# Patient Record
Sex: FEMALE | Race: WHITE | Employment: OTHER | URBAN - METROPOLITAN AREA
[De-identification: names, ages, dates, MRNs, and addresses within clinical notes are randomized per-mention and may not be internally consistent; named-entity substitution may affect disease eponyms.]

---

## 2020-12-09 ENCOUNTER — OFFICE VISIT (OUTPATIENT)
Dept: CARDIOLOGY CLINIC | Age: 85
End: 2020-12-09
Payer: MEDICARE

## 2020-12-09 VITALS
DIASTOLIC BLOOD PRESSURE: 80 MMHG | HEIGHT: 62 IN | OXYGEN SATURATION: 97 % | HEART RATE: 106 BPM | RESPIRATION RATE: 18 BRPM | SYSTOLIC BLOOD PRESSURE: 120 MMHG | BODY MASS INDEX: 34.6 KG/M2 | WEIGHT: 188 LBS

## 2020-12-09 DIAGNOSIS — E78.2 MIXED HYPERLIPIDEMIA: ICD-10-CM

## 2020-12-09 DIAGNOSIS — R06.02 SOB (SHORTNESS OF BREATH): ICD-10-CM

## 2020-12-09 DIAGNOSIS — Z82.49 FAMILY HISTORY OF EARLY CAD: ICD-10-CM

## 2020-12-09 DIAGNOSIS — I20.0 UNSTABLE ANGINA (HCC): Primary | ICD-10-CM

## 2020-12-09 DIAGNOSIS — R00.0 TACHYCARDIA: ICD-10-CM

## 2020-12-09 DIAGNOSIS — Z87.891 HISTORY OF TOBACCO USE: ICD-10-CM

## 2020-12-09 DIAGNOSIS — I10 BENIGN ESSENTIAL HTN: ICD-10-CM

## 2020-12-09 PROCEDURE — 1090F PRES/ABSN URINE INCON ASSESS: CPT | Performed by: INTERNAL MEDICINE

## 2020-12-09 PROCEDURE — G8417 CALC BMI ABV UP PARAM F/U: HCPCS | Performed by: INTERNAL MEDICINE

## 2020-12-09 PROCEDURE — 93010 ELECTROCARDIOGRAM REPORT: CPT | Performed by: INTERNAL MEDICINE

## 2020-12-09 PROCEDURE — 99204 OFFICE O/P NEW MOD 45 MIN: CPT | Performed by: INTERNAL MEDICINE

## 2020-12-09 PROCEDURE — 1101F PT FALLS ASSESS-DOCD LE1/YR: CPT | Performed by: INTERNAL MEDICINE

## 2020-12-09 PROCEDURE — G8510 SCR DEP NEG, NO PLAN REQD: HCPCS | Performed by: INTERNAL MEDICINE

## 2020-12-09 PROCEDURE — 93005 ELECTROCARDIOGRAM TRACING: CPT | Performed by: INTERNAL MEDICINE

## 2020-12-09 PROCEDURE — G8536 NO DOC ELDER MAL SCRN: HCPCS | Performed by: INTERNAL MEDICINE

## 2020-12-09 PROCEDURE — G0463 HOSPITAL OUTPT CLINIC VISIT: HCPCS | Performed by: INTERNAL MEDICINE

## 2020-12-09 PROCEDURE — G8400 PT W/DXA NO RESULTS DOC: HCPCS | Performed by: INTERNAL MEDICINE

## 2020-12-09 PROCEDURE — G8427 DOCREV CUR MEDS BY ELIG CLIN: HCPCS | Performed by: INTERNAL MEDICINE

## 2020-12-09 RX ORDER — CHOLECALCIFEROL (VITAMIN D3) 125 MCG
100 CAPSULE ORAL DAILY
COMMUNITY

## 2020-12-09 RX ORDER — MINERAL OIL
180 ENEMA (ML) RECTAL AS NEEDED
COMMUNITY

## 2020-12-09 RX ORDER — MULTIVITAMIN
1 TABLET ORAL DAILY
COMMUNITY

## 2020-12-09 RX ORDER — SIMVASTATIN 20 MG/1
TABLET, FILM COATED ORAL
COMMUNITY

## 2020-12-09 RX ORDER — BISMUTH SUBSALICYLATE 262 MG
1 TABLET,CHEWABLE ORAL DAILY
COMMUNITY

## 2020-12-09 RX ORDER — LISINOPRIL 20 MG/1
20 TABLET ORAL DAILY
COMMUNITY
Start: 2020-12-07

## 2020-12-09 RX ORDER — GLUCOSAMINE SULFATE 1500 MG
POWDER IN PACKET (EA) ORAL DAILY
COMMUNITY

## 2020-12-09 RX ORDER — AMOXICILLIN 500 MG/1
CAPSULE ORAL
COMMUNITY
Start: 2020-11-15

## 2020-12-09 RX ORDER — LANOLIN ALCOHOL/MO/W.PET/CERES
1000 CREAM (GRAM) TOPICAL DAILY
COMMUNITY

## 2020-12-09 RX ORDER — DICLOFENAC SODIUM 75 MG/1
TABLET, DELAYED RELEASE ORAL
COMMUNITY
Start: 2020-12-04

## 2020-12-09 NOTE — PROGRESS NOTES
[address]: Olivia Barajas  [phone]    History of Present Illness: Ms. Kyung Interiano is an 83 yo F with a history of tobacco use, essential hypertension, mixed hyperlipidemia, stage 4 bladder cancer, status post resection and therapy in remission since approximately ten years ago, referred by Dr. Natty Pardo for cardiac evaluation. She is here due to progressive shortness of breath. She thinks it has been the case for the last year or two. She does have some baseline anemia that has been monitored. She denies any exertional chest pain. She gets occasional cramping pain over her body and chest, but this is nonexertional.  She gets occasional palpitations with activity. No lightheadedness or dizziness. She is compensated on exam with clear lungs. She does have chronic trace to 1+ lower extremity edema. Her EKG is sinus tachycardia, heart rate of 106. There is a single PVC. Soc hx. Tobacco use quit several yrs ago. Fam hx. Father with CAD 62s, . Assessment and Plan:    1. Unstable angina. Multiple risk factors, dyspnea on exertion, could be anginal equivalent; will proceed with an echocardiogram and stress test for further evaluation. She has a history of hip issues and surgery and will do this Lexiscan. 2. Essential hypertension. Blood pressure is controlled. 3. Mixed hyperlipidemia. 4. Stage 4 bladder cancer, in remission. 5. History of tobacco use. Quit many years ago. 6. Family history of early coronary artery disease. She  has no past medical history on file. All other systems negative except as above. PE  Vitals:    12/09/20 1106   BP: 120/80   Pulse: (!) 106   Resp: 18   SpO2: 97%   Weight: 188 lb (85.3 kg)   Height: 5' 2\" (1.575 m)    Body mass index is 34.39 kg/m².    General appearance - alert, well appearing, and in no distress  Mental status - affect appropriate to mood  Eyes - sclera anicteric, moist mucous membranes  Neck - supple, no JVD  Chest - clear to auscultation, no wheezes, rales or rhonchi  Heart - normal rate, regular rhythm, normal S1, S2, no murmurs, rubs, clicks or gallops  Abdomen - soft, nontender, nondistended, no masses or organomegaly  Neurological - no focal deficit  Extremities - peripheral pulses normal, no pedal edema    Recent Labs:  No results found for: CHOL, CHOLX, CHLST, CHOLV, 655847, HDL, HDLP, LDL, LDLC, DLDLP, TGLX, TRIGL, TRIGP, CHHD, CHHDX  No results found for: MACO, CREAPOC, ACREA, CREA, REFC3, REFC4  No results found for: BUN, BUNPOC, IBUN, MBUNV, BUNV  No results found for: K, KI, PLK, WBK  No results found for: HBA1C, HGBE8, SAB3JFYY, OZG1QNCI  No results found for: HGBPOC, HGB, HGBP, HGBEXT, HGBEXT  No results found for: PLT, PLTEXT, PLTEXT    Reviewed:  No past medical history on file. Social History     Tobacco Use   Smoking Status Former Smoker   Smokeless Tobacco Never Used     Social History     Substance and Sexual Activity   Alcohol Use Yes     Allergies   Allergen Reactions    Sulfa (Sulfonamide Antibiotics) Itching       Current Outpatient Medications   Medication Sig    amoxicillin (AMOXIL) 500 mg capsule TAKE 4 CAPSULES BY MOUTH 1 HR PRIOR TO DENTAL PROCEDURE    cyanocobalamin 1,000 mcg tablet Take 1,000 mcg by mouth daily.  diclofenac EC (VOLTAREN) 75 mg EC tablet TAKE 1 TABLET BY MOUTH EVERY MORNING WITH FOOD    fexofenadine (ALLEGRA) 180 mg tablet Take 180 mg by mouth as needed.  lisinopriL (PRINIVIL, ZESTRIL) 20 mg tablet Take 20 mg by mouth daily.  calcium-cholecalciferol, D3, (CALTRATE 600+D) tablet Take 1 Tab by mouth daily.  co-enzyme Q-10 (Co Q-10) 100 mg capsule Take 100 mg by mouth daily.  multivitamin (ONE A DAY) tablet Take 1 Tab by mouth daily.  simvastatin (ZOCOR) 20 mg tablet Take  by mouth nightly.  cholecalciferol (Vitamin D3) 25 mcg (1,000 unit) cap Take  by mouth daily. No current facility-administered medications for this visit.         MD Yossi Ar[facility] [place]  [address]  [address]

## 2020-12-29 ENCOUNTER — TELEPHONE (OUTPATIENT)
Dept: CARDIOLOGY CLINIC | Age: 85
End: 2020-12-29

## 2020-12-30 ENCOUNTER — ANCILLARY PROCEDURE (OUTPATIENT)
Dept: CARDIOLOGY CLINIC | Age: 85
End: 2020-12-30
Payer: MEDICARE

## 2020-12-30 ENCOUNTER — DOCUMENTATION ONLY (OUTPATIENT)
Dept: CARDIOLOGY CLINIC | Age: 85
End: 2020-12-30

## 2020-12-30 VITALS
BODY MASS INDEX: 34.6 KG/M2 | SYSTOLIC BLOOD PRESSURE: 134 MMHG | HEIGHT: 62 IN | WEIGHT: 188 LBS | DIASTOLIC BLOOD PRESSURE: 84 MMHG

## 2020-12-30 VITALS
WEIGHT: 188 LBS | HEIGHT: 62 IN | DIASTOLIC BLOOD PRESSURE: 80 MMHG | SYSTOLIC BLOOD PRESSURE: 120 MMHG | BODY MASS INDEX: 34.6 KG/M2

## 2020-12-30 DIAGNOSIS — R00.0 TACHYCARDIA: ICD-10-CM

## 2020-12-30 DIAGNOSIS — I20.0 UNSTABLE ANGINA (HCC): ICD-10-CM

## 2020-12-30 DIAGNOSIS — E78.2 MIXED HYPERLIPIDEMIA: ICD-10-CM

## 2020-12-30 DIAGNOSIS — R06.02 SOB (SHORTNESS OF BREATH): ICD-10-CM

## 2020-12-30 DIAGNOSIS — Z87.891 HISTORY OF TOBACCO USE: ICD-10-CM

## 2020-12-30 DIAGNOSIS — I10 BENIGN ESSENTIAL HTN: ICD-10-CM

## 2020-12-30 DIAGNOSIS — Z82.49 FAMILY HISTORY OF EARLY CAD: ICD-10-CM

## 2020-12-30 LAB
ECHO AO ASC DIAM: 3.62 CM
ECHO AO ROOT DIAM: 3.53 CM
ECHO AV AREA PEAK VELOCITY: 2.33 CM2
ECHO AV AREA VTI: 2.22 CM2
ECHO AV AREA/BSA PEAK VELOCITY: 1.3 CM2/M2
ECHO AV AREA/BSA VTI: 1.2 CM2/M2
ECHO AV MEAN GRADIENT: 6.99 MMHG
ECHO AV PEAK GRADIENT: 12.1 MMHG
ECHO AV PEAK VELOCITY: 173.53 CM/S
ECHO AV VTI: 31.93 CM
ECHO EST RA PRESSURE: 8 MMHG
ECHO IVC PROX: 2.27 CM
ECHO LA AREA 4C: 23.29 CM2
ECHO LA MAJOR AXIS: 3.86 CM
ECHO LA MINOR AXIS: 2.07 CM
ECHO LA VOL 2C: 74.77 ML (ref 22–52)
ECHO LA VOL 4C: 70.54 ML (ref 22–52)
ECHO LA VOL BP: 77.29 ML (ref 22–52)
ECHO LA VOL/BSA BIPLANE: 41.51 ML/M2 (ref 16–28)
ECHO LA VOLUME INDEX A2C: 40.16 ML/M2 (ref 16–28)
ECHO LA VOLUME INDEX A4C: 37.89 ML/M2 (ref 16–28)
ECHO LV E' LATERAL VELOCITY: 4.52 CM/S
ECHO LV E' SEPTAL VELOCITY: 5.41 CM/S
ECHO LV EDV A2C: 175.9 ML
ECHO LV EDV A4C: 130.71 ML
ECHO LV EDV BP: 157.34 ML (ref 56–104)
ECHO LV EDV INDEX A4C: 70.2 ML/M2
ECHO LV EDV INDEX BP: 84.5 ML/M2
ECHO LV EDV NDEX A2C: 94.5 ML/M2
ECHO LV EJECTION FRACTION 3D: 22.9 PERCENT
ECHO LV EJECTION FRACTION A2C: 12 PERCENT
ECHO LV EJECTION FRACTION A4C: 27 PERCENT
ECHO LV EJECTION FRACTION BIPLANE: 19 PERCENT (ref 55–100)
ECHO LV ESV A2C: 155.45 ML
ECHO LV ESV A4C: 95.08 ML
ECHO LV ESV BP: 127.42 ML (ref 19–49)
ECHO LV ESV INDEX A2C: 83.5 ML/M2
ECHO LV ESV INDEX A4C: 51.1 ML/M2
ECHO LV ESV INDEX BP: 68.4 ML/M2
ECHO LV GLOBAL LONGITUDINAL STRAIN (GLS): -5.7 PERCENT
ECHO LV INTERNAL DIMENSION DIASTOLIC: 5.53 CM (ref 3.9–5.3)
ECHO LV INTERNAL DIMENSION SYSTOLIC: 5.2 CM
ECHO LV IVSD: 1.44 CM (ref 0.6–0.9)
ECHO LV MASS 2D: 360.1 G (ref 67–162)
ECHO LV MASS INDEX 2D: 193.4 G/M2 (ref 43–95)
ECHO LV POSTERIOR WALL DIASTOLIC: 1.47 CM (ref 0.6–0.9)
ECHO LVOT DIAM: 2.54 CM
ECHO LVOT PEAK GRADIENT: 2.56 MMHG
ECHO LVOT PEAK VELOCITY: 79.67 CM/S
ECHO LVOT SV: 71 ML
ECHO LVOT VTI: 13.96 CM
ECHO MV A VELOCITY: 123.95 CM/S
ECHO MV AREA PHT: 7.44 CM2
ECHO MV E DECELERATION TIME (DT): 101.99 MS
ECHO MV E VELOCITY: 103.59 CM/S
ECHO MV E/A RATIO: 0.84
ECHO MV E/E' LATERAL: 22.92
ECHO MV E/E' RATIO (AVERAGED): 21.03
ECHO MV E/E' SEPTAL: 19.15
ECHO MV PRESSURE HALF TIME (PHT): 29.58 MS
ECHO RA MAJOR AXIS: 3.93 CM
ECHO RIGHT VENTRICULAR SYSTOLIC PRESSURE (RVSP): 41.32 MMHG
ECHO RV INTERNAL DIMENSION: 3.38 CM
ECHO RV TAPSE: 1.54 CM (ref 1.5–2)
ECHO TV REGURGITANT MAX VELOCITY: 288.63 CM/S
ECHO TV REGURGITANT PEAK GRADIENT: 33.32 MMHG
GLOBAL LONGITUDINAL STRAIN 2 CHAMBER: -6.3 PERCENT
GLOBAL LONGITUDINAL STRAIN 4 CHAMBER: -6.7 PERCENT
GLOBAL LONGITUDINAL STRAIN LONG AXIS: -4.3 PERCENT
LA VOL DISK BP: 74.05 ML (ref 22–52)
LVOT MG: 1.44 MMHG

## 2020-12-30 PROCEDURE — 93306 TTE W/DOPPLER COMPLETE: CPT | Performed by: SPECIALIST

## 2020-12-30 PROCEDURE — 93018 CV STRESS TEST I&R ONLY: CPT | Performed by: INTERNAL MEDICINE

## 2020-12-30 PROCEDURE — 93016 CV STRESS TEST SUPVJ ONLY: CPT | Performed by: INTERNAL MEDICINE

## 2020-12-30 PROCEDURE — 78452 HT MUSCLE IMAGE SPECT MULT: CPT | Performed by: INTERNAL MEDICINE

## 2020-12-30 PROCEDURE — A9500 TC99M SESTAMIBI: HCPCS | Performed by: INTERNAL MEDICINE

## 2020-12-30 RX ORDER — FUROSEMIDE 40 MG/1
40 TABLET ORAL DAILY
Qty: 40 TAB | Refills: 3 | Status: SHIPPED | OUTPATIENT
Start: 2020-12-30 | End: 2021-01-13

## 2020-12-30 RX ORDER — TETRAKIS(2-METHOXYISOBUTYLISOCYANIDE)COPPER(I) TETRAFLUOROBORATE 1 MG/ML
10 INJECTION, POWDER, LYOPHILIZED, FOR SOLUTION INTRAVENOUS ONCE
Status: COMPLETED | OUTPATIENT
Start: 2020-12-30 | End: 2020-12-30

## 2020-12-30 RX ORDER — TETRAKIS(2-METHOXYISOBUTYLISOCYANIDE)COPPER(I) TETRAFLUOROBORATE 1 MG/ML
30 INJECTION, POWDER, LYOPHILIZED, FOR SOLUTION INTRAVENOUS ONCE
Status: COMPLETED | OUTPATIENT
Start: 2020-12-30 | End: 2020-12-30

## 2020-12-30 RX ADMIN — TECHNETIUM TC 99M SESTAMIBI 25.4 MILLICURIE: 1 INJECTION INTRAVENOUS at 11:00

## 2020-12-30 RX ADMIN — TETRAKIS(2-METHOXYISOBUTYLISOCYANIDE)COPPER(I) TETRAFLUOROBORATE 8.7 MILLICURIE: 1 INJECTION, POWDER, LYOPHILIZED, FOR SOLUTION INTRAVENOUS at 09:15

## 2020-12-30 RX ADMIN — REGADENOSON 0.4 MG: 0.08 INJECTION, SOLUTION INTRAVENOUS at 11:00

## 2020-12-30 NOTE — PROGRESS NOTES
Echo on roca pt. lvef is way down. I told her about it, she looked fine so I said to proceed with kamini. Jennie Esters, this is pt we started on lasix and should have appt with dr Kathy Walsh next week to make further med adjustments and review nuc results.
Future Appointments   Date Time Provider Dinah Conti   1/8/2021 11:20 AM MD CHRISTIANO Cloud AMB
Self

## 2020-12-30 NOTE — PROGRESS NOTES
Dr. Ashley Rodriguez saw patient today during Stress test. Dr. Ashley Rodriguez was informed EF was 15% and started lasix 40 mg once a day. Per Dr. Ashley Rodriguez scheduled pt to be seen by Dr. Dana Whitt next week to go over results and start new medications. Pt verbalized understanding of information discussed w/ no further questions at this time. Cardiologist: Dr. Ashley Rodriguez    Last appt: 12/9/2020  Future Appointments   Date Time Provider Dinah Conti   1/8/2021 11:20 AM MD CHRISTIANO Lopez AMB       Requested Prescriptions     Signed Prescriptions Disp Refills    furosemide (LASIX) 40 mg tablet 40 Tab 3     Sig: Take 1 Tab by mouth daily. Refills VO per Dr. Ashley Rodriguez.

## 2021-01-04 LAB
STRESS BASELINE DIAS BP: 84 MMHG
STRESS BASELINE HR: 92 BPM
STRESS BASELINE SYS BP: 134 MMHG
STRESS O2 SAT PEAK: 96 %
STRESS O2 SAT REST: 99 %
STRESS PEAK DIAS BP: 68 MMHG
STRESS PEAK SYS BP: 115 MMHG
STRESS PERCENT HR ACHIEVED: 77 %
STRESS POST PEAK HR: 105 BPM
STRESS RATE PRESSURE PRODUCT: NORMAL BPM*MMHG
STRESS TARGET HR: 137 BPM

## 2021-01-08 ENCOUNTER — OFFICE VISIT (OUTPATIENT)
Dept: CARDIOLOGY CLINIC | Age: 86
End: 2021-01-08
Payer: MEDICARE

## 2021-01-08 VITALS
OXYGEN SATURATION: 97 % | BODY MASS INDEX: 33.31 KG/M2 | RESPIRATION RATE: 14 BRPM | HEART RATE: 100 BPM | DIASTOLIC BLOOD PRESSURE: 70 MMHG | HEIGHT: 62 IN | SYSTOLIC BLOOD PRESSURE: 120 MMHG | WEIGHT: 181 LBS

## 2021-01-08 DIAGNOSIS — I10 BENIGN ESSENTIAL HTN: ICD-10-CM

## 2021-01-08 DIAGNOSIS — I20.0 UNSTABLE ANGINA (HCC): Primary | ICD-10-CM

## 2021-01-08 DIAGNOSIS — I42.0 DILATED CARDIOMYOPATHY (HCC): ICD-10-CM

## 2021-01-08 DIAGNOSIS — Z87.891 HISTORY OF TOBACCO USE: ICD-10-CM

## 2021-01-08 DIAGNOSIS — E78.2 MIXED HYPERLIPIDEMIA: ICD-10-CM

## 2021-01-08 DIAGNOSIS — Z82.49 FAMILY HISTORY OF EARLY CAD: ICD-10-CM

## 2021-01-08 PROCEDURE — G8510 SCR DEP NEG, NO PLAN REQD: HCPCS | Performed by: INTERNAL MEDICINE

## 2021-01-08 PROCEDURE — G8427 DOCREV CUR MEDS BY ELIG CLIN: HCPCS | Performed by: INTERNAL MEDICINE

## 2021-01-08 PROCEDURE — G0463 HOSPITAL OUTPT CLINIC VISIT: HCPCS | Performed by: INTERNAL MEDICINE

## 2021-01-08 PROCEDURE — G8752 SYS BP LESS 140: HCPCS | Performed by: INTERNAL MEDICINE

## 2021-01-08 PROCEDURE — G8536 NO DOC ELDER MAL SCRN: HCPCS | Performed by: INTERNAL MEDICINE

## 2021-01-08 PROCEDURE — 1090F PRES/ABSN URINE INCON ASSESS: CPT | Performed by: INTERNAL MEDICINE

## 2021-01-08 PROCEDURE — G8417 CALC BMI ABV UP PARAM F/U: HCPCS | Performed by: INTERNAL MEDICINE

## 2021-01-08 PROCEDURE — G8754 DIAS BP LESS 90: HCPCS | Performed by: INTERNAL MEDICINE

## 2021-01-08 PROCEDURE — G8400 PT W/DXA NO RESULTS DOC: HCPCS | Performed by: INTERNAL MEDICINE

## 2021-01-08 PROCEDURE — 1101F PT FALLS ASSESS-DOCD LE1/YR: CPT | Performed by: INTERNAL MEDICINE

## 2021-01-08 PROCEDURE — 99215 OFFICE O/P EST HI 40 MIN: CPT | Performed by: INTERNAL MEDICINE

## 2021-01-08 RX ORDER — GLUCOSAMINE/CHONDR SU A SOD 750-600 MG
1 TABLET ORAL DAILY
COMMUNITY

## 2021-01-08 RX ORDER — CARVEDILOL 6.25 MG/1
6.25 TABLET ORAL 2 TIMES DAILY WITH MEALS
Qty: 60 TAB | Refills: 5 | Status: ON HOLD | OUTPATIENT
Start: 2021-01-08 | End: 2021-01-15 | Stop reason: SDUPTHER

## 2021-01-08 RX ORDER — GUAIFENESIN 100 MG/5ML
81 LIQUID (ML) ORAL DAILY
Qty: 30 TAB | Refills: 5 | Status: SHIPPED | OUTPATIENT
Start: 2021-01-08 | End: 2021-07-01

## 2021-01-08 NOTE — PROGRESS NOTES
[address]: Wilberto Wilhelm  [phone]    History of Present Illness: Ms. Fede Hebert is an 83 yo F with a history of tobacco use, essential hypertension, mixed hyperlipidemia, stage 4 bladder cancer, status post resection and therapy in remission since approximately ten years ago, seen initially for shortness of breath. On her last visit due to unstable angina, progressive shortness of breath, proceeded with an echocardiogram and stress test for further evaluation. Her echocardiogram ended up being abnormal demonstrating an EF of 20% and her subsequent stress test demonstrated small to medium sized reversible lesion, apical and lateral reversibility consistent with ischemia. Her EF was calculated at 34% by nuclear scan. After her echocardiogram, Dr. Carolin Mcgee started Lasix and overall symptom wise she says her breathing is better. She denies any exertional chest pain. No significant lightheadedness, dizziness or syncope. She does live with her daughter. She was [facility] for a tooth in her jaw that may need to come out. She is compensated on exam with clear lungs. She does have 1+ bilateral lower extremity edema. Assessment and Plan:    1. Severe cardiomyopathy. Overall compensated. High suspicion this is due to underlying coronary artery disease. Her stress test was also abnormal apical lateral.  Discussed the benefits and risks of cardiac catheterization and she is agreeable to proceed with this and will set this up. In the meantime, she is already on an ACE inhibitor and diuretic and will add Coreg and aspirin. Continue her statin. 2. Essential hypertension. Blood pressure is controlled. 3. Mixed hyperlipidemia. Tolerating statin. 4. Stage 4 bladder cancer in remission. 5. History of tobacco use. Quit many years ago. 6. Family history of early coronary artery disease. She  has no past medical history on file. All other systems negative except as above. PE  Vitals:    01/08/21 1117   BP: 120/70   Pulse: 100   Resp: 14   SpO2: 97%   Weight: 181 lb (82.1 kg)   Height: 5' 2\" (1.575 m)    Body mass index is 33.11 kg/m². General appearance - alert, well appearing, and in no distress  Mental status - affect appropriate to mood  Eyes - sclera anicteric, moist mucous membranes  Neck - supple, no JVD  Chest - clear to auscultation, no wheezes, rales or rhonchi  Heart - normal rate, regular rhythm, normal S1, S2, no murmurs, rubs, clicks or gallops  Abdomen - soft, nontender, nondistended, no masses or organomegaly  Neurological - no focal deficit  Extremities - peripheral pulses normal, no pedal edema    Recent Labs:  No results found for: CHOL, CHOLX, CHLST, CHOLV, 818882, HDL, HDLP, LDL, LDLC, DLDLP, TGLX, TRIGL, TRIGP, CHHD, CHHDX  No results found for: MACO, CREAPOC, ACREA, CREA, REFC3, REFC4  No results found for: BUN, BUNPOC, IBUN, MBUNV, BUNV  No results found for: K, KI, PLK, WBK  No results found for: HBA1C, HGBE8, LHZ2NNRC, HPN6ADYV  No results found for: HGBPOC, HGB, HGBP, HGBEXT, HGBEXT  No results found for: PLT, PLTEXT, PLTEXT    Reviewed:  No past medical history on file. Social History     Tobacco Use   Smoking Status Former Smoker   Smokeless Tobacco Never Used     Social History     Substance and Sexual Activity   Alcohol Use Yes     Allergies   Allergen Reactions    Sulfa (Sulfonamide Antibiotics) Itching       Current Outpatient Medications   Medication Sig    Biotin 2,500 mcg cap Take 1 Cap by mouth daily.  furosemide (LASIX) 40 mg tablet Take 1 Tab by mouth daily.  amoxicillin (AMOXIL) 500 mg capsule TAKE 4 CAPSULES BY MOUTH 1 HR PRIOR TO DENTAL PROCEDURE    cyanocobalamin 1,000 mcg tablet Take 1,000 mcg by mouth daily.  diclofenac EC (VOLTAREN) 75 mg EC tablet TAKE 1 TABLET BY MOUTH EVERY MORNING WITH FOOD    fexofenadine (ALLEGRA) 180 mg tablet Take 180 mg by mouth as needed.     lisinopriL (PRINIVIL, ZESTRIL) 20 mg tablet Take 20 mg by mouth daily.  calcium-cholecalciferol, D3, (CALTRATE 600+D) tablet Take 1 Tab by mouth daily.  co-enzyme Q-10 (Co Q-10) 100 mg capsule Take 100 mg by mouth daily.  multivitamin (ONE A DAY) tablet Take 1 Tab by mouth daily.  simvastatin (ZOCOR) 20 mg tablet Take  by mouth nightly.  cholecalciferol (Vitamin D3) 25 mcg (1,000 unit) cap Take  by mouth daily. No current facility-administered medications for this visit.         Ethel Haney MD  [facility] heart and Vascular [place]  [address] [facility] [address]  [facility], [address]

## 2021-01-08 NOTE — LETTER
Patient:  Connie Tejeda YOB: 1935 Date of Visit: 1/8/2021 Yohan Ibarra MD 
[address] [place] [address] 7 [phone] Via Fax: [phone]: 
 
Dear Zak Guzman, 
 
Ms. Catie Amos is an 83 yo F with a history of tobacco use, essential hypertension, mixed hyperlipidemia, stage 4 bladder cancer, status post resection and therapy in remission since approximately ten years ago, seen initially for shortness of breath. On her last visit due to unstable angina, progressive shortness of breath, proceeded with an echocardiogram and stress test for further evaluation. Her echocardiogram ended up being abnormal demonstrating an EF of 20% and her subsequent stress test demonstrated small to medium sized reversible lesion, apical and lateral reversibility consistent with ischemia. Her EF was calculated at 34% by nuclear scan. After her echocardiogram, Dr. Rebecca Hawkins started Lasix and overall symptom wise she says her breathing is better. She denies any exertional chest pain. No significant lightheadedness, dizziness or syncope. She does live with her daughter. She was seeing [facility] for a tooth in her jaw that may need to come out. She is compensated on exam with clear lungs. She does have 1+ bilateral lower extremity edema. Assessment and Plan: 1. Severe cardiomyopathy. Overall compensated. High suspicion this is due to underlying coronary artery disease. Her stress test was also abnormal apical lateral.  Discussed the benefits and risks of cardiac catheterization and she is agreeable to proceed with this and will set this up. In the meantime, she is already on an ACE inhibitor and diuretic and will add Coreg and aspirin. Continue her statin. 2. Essential hypertension. Blood pressure is controlled. 3. Mixed hyperlipidemia. Tolerating statin. 4. Stage 4 bladder cancer in remission. 5. History of tobacco use. Quit many years ago. 6. Family history of early coronary artery disease. If you have questions, please do not hesitate to call me. Sincerely, Jillian Kim MD

## 2021-01-08 NOTE — PATIENT INSTRUCTIONS
Good Religious address: 
[facility], [place] Procedure:Left Heart Cath Your procedure is scheduled for 1/15/21 @ 1:00 pm. You need to arrive about 2 hours prior to procedure, so please arrive by 11:00 am to [address] will proceed to the main entrance of the hospital where you will be screened and checked in. Bring your insurance information and list of current medications. You may not have anything to eat or drink after midngiht, except for sips of water to take medications. Medication instructions: Take with sip of water *Have lab work completed no more then 30 days prior to the procedure but at least 2-3 days prior to the procedure. *You will need someone to drive you home after the procedure. Plan to be at [facility] a total of 6-8 hours. *Arrange for a responsible adult to help you at home for at least 24 hours, 
*Wear comfortable clothing. Leave jewelry, money, and other valuables at home. You may wear dentures, eyeglasses, and/or hearing aids. *Bring an overnight bag with you (just incase you need to spend the night.) Post Procedure Instructions: 
*No driving for 24 hours post procedure. *No heavy lifting (over 10 lbs) or strenuous activity for 48 hours. *No tub baths, swimming, hot tubs, or spas for 1 week. The band aid over cath site may be removed the day after procedure and site washed gently with soap and water. *The site may appear bruised/ discolored for a couple of weeks. A small knot may be present. You may experience tenderness or soreness in groin area. This may be relieved with the use of Tylenol. *If there is any visible blood at the site, hold pressure for 20 minutes. *Call the office if you should notice numbness, tingling, coldness, or loss of feeling in the area. Call the office if you have a fever within 2-3 days after procedure. Remember, when you begin lifting things, use proper body mechanics and bend with your knees, centering the weight on your legs. Please call with any questions: [phone]. You may also contact the cath lab directly at [phone]

## 2021-01-13 ENCOUNTER — TELEPHONE (OUTPATIENT)
Dept: CARDIOLOGY CLINIC | Age: 86
End: 2021-01-13

## 2021-01-13 RX ORDER — FUROSEMIDE 20 MG/1
20 TABLET ORAL DAILY
Qty: 30 TAB | Refills: 0
Start: 2021-01-13 | End: 2021-04-02

## 2021-01-13 NOTE — TELEPHONE ENCOUNTER
----- Message from Ethel Haney MD sent at 1/8/2021 10:39 PM EST -----  Please let pt know labs showed elevated Cr and would hold lasix for 3 days then restart at half dose.  thx

## 2021-01-13 NOTE — TELEPHONE ENCOUNTER
Called patient. Two patient indentifiers verified. Pt was informed of message. Pt verbalized understanding and denies any further questions at this time.  Updated medications list

## 2021-01-14 RX ORDER — SODIUM CHLORIDE 0.9 % (FLUSH) 0.9 %
5-40 SYRINGE (ML) INJECTION EVERY 8 HOURS
Status: CANCELLED | OUTPATIENT
Start: 2021-01-14

## 2021-01-14 RX ORDER — SODIUM CHLORIDE 9 MG/ML
75 INJECTION, SOLUTION INTRAVENOUS CONTINUOUS
Status: CANCELLED | OUTPATIENT
Start: 2021-01-14 | End: 2021-01-14

## 2021-01-14 RX ORDER — DIPHENHYDRAMINE HYDROCHLORIDE 50 MG/ML
50 INJECTION, SOLUTION INTRAMUSCULAR; INTRAVENOUS
Status: CANCELLED | OUTPATIENT
Start: 2021-01-14 | End: 2021-01-15

## 2021-01-15 ENCOUNTER — HOSPITAL ENCOUNTER (OUTPATIENT)
Age: 86
Setting detail: OUTPATIENT SURGERY
Discharge: HOME | End: 2021-01-15
Attending: INTERNAL MEDICINE | Admitting: INTERNAL MEDICINE
Payer: MEDICARE

## 2021-01-15 VITALS
RESPIRATION RATE: 17 BRPM | DIASTOLIC BLOOD PRESSURE: 80 MMHG | HEART RATE: 74 BPM | WEIGHT: 184 LBS | HEIGHT: 62 IN | BODY MASS INDEX: 33.86 KG/M2 | SYSTOLIC BLOOD PRESSURE: 148 MMHG | TEMPERATURE: 98.5 F | OXYGEN SATURATION: 96 %

## 2021-01-15 DIAGNOSIS — I20.0 UNSTABLE ANGINA (HCC): ICD-10-CM

## 2021-01-15 PROCEDURE — 93458 L HRT ARTERY/VENTRICLE ANGIO: CPT | Performed by: INTERNAL MEDICINE

## 2021-01-15 PROCEDURE — C1894 INTRO/SHEATH, NON-LASER: HCPCS | Performed by: INTERNAL MEDICINE

## 2021-01-15 PROCEDURE — 74011000636 HC RX REV CODE- 636: Performed by: INTERNAL MEDICINE

## 2021-01-15 PROCEDURE — 99153 MOD SED SAME PHYS/QHP EA: CPT | Performed by: INTERNAL MEDICINE

## 2021-01-15 PROCEDURE — 77030004532 HC CATH ANGI DX IMP BSC -A: Performed by: INTERNAL MEDICINE

## 2021-01-15 PROCEDURE — 77030019569 HC BND COMPR RAD TERU -B: Performed by: INTERNAL MEDICINE

## 2021-01-15 PROCEDURE — 77030010221 HC SPLNT WR POS TELE -B: Performed by: INTERNAL MEDICINE

## 2021-01-15 PROCEDURE — 99152 MOD SED SAME PHYS/QHP 5/>YRS: CPT | Performed by: INTERNAL MEDICINE

## 2021-01-15 PROCEDURE — 74011000250 HC RX REV CODE- 250: Performed by: INTERNAL MEDICINE

## 2021-01-15 PROCEDURE — 74011250636 HC RX REV CODE- 250/636: Performed by: INTERNAL MEDICINE

## 2021-01-15 RX ORDER — LIDOCAINE HYDROCHLORIDE 10 MG/ML
INJECTION INFILTRATION; PERINEURAL AS NEEDED
Status: DISCONTINUED | OUTPATIENT
Start: 2021-01-15 | End: 2021-01-15 | Stop reason: HOSPADM

## 2021-01-15 RX ORDER — CARVEDILOL 12.5 MG/1
6.25 TABLET ORAL 2 TIMES DAILY WITH MEALS
Qty: 60 TAB | Refills: 5 | Status: SHIPPED | OUTPATIENT
Start: 2021-01-15 | End: 2021-02-01

## 2021-01-15 RX ORDER — HEPARIN SODIUM 1000 [USP'U]/ML
INJECTION, SOLUTION INTRAVENOUS; SUBCUTANEOUS AS NEEDED
Status: DISCONTINUED | OUTPATIENT
Start: 2021-01-15 | End: 2021-01-15 | Stop reason: HOSPADM

## 2021-01-15 RX ORDER — HEPARIN SODIUM 200 [USP'U]/100ML
INJECTION, SOLUTION INTRAVENOUS
Status: COMPLETED | OUTPATIENT
Start: 2021-01-15 | End: 2021-01-15

## 2021-01-15 RX ORDER — FENTANYL CITRATE 50 UG/ML
INJECTION, SOLUTION INTRAMUSCULAR; INTRAVENOUS AS NEEDED
Status: DISCONTINUED | OUTPATIENT
Start: 2021-01-15 | End: 2021-01-15 | Stop reason: HOSPADM

## 2021-01-15 RX ORDER — SODIUM CHLORIDE 9 MG/ML
75 INJECTION, SOLUTION INTRAVENOUS CONTINUOUS
Status: DISPENSED | OUTPATIENT
Start: 2021-01-15 | End: 2021-01-15

## 2021-01-15 RX ORDER — MIDAZOLAM HYDROCHLORIDE 1 MG/ML
INJECTION, SOLUTION INTRAMUSCULAR; INTRAVENOUS AS NEEDED
Status: DISCONTINUED | OUTPATIENT
Start: 2021-01-15 | End: 2021-01-15 | Stop reason: HOSPADM

## 2021-01-15 RX ORDER — DIPHENHYDRAMINE HYDROCHLORIDE 50 MG/ML
50 INJECTION, SOLUTION INTRAMUSCULAR; INTRAVENOUS
Status: DISCONTINUED | OUTPATIENT
Start: 2021-01-15 | End: 2021-01-15 | Stop reason: HOSPADM

## 2021-01-15 RX ORDER — SODIUM CHLORIDE 0.9 % (FLUSH) 0.9 %
5-40 SYRINGE (ML) INJECTION EVERY 8 HOURS
Status: DISCONTINUED | OUTPATIENT
Start: 2021-01-15 | End: 2021-01-15 | Stop reason: HOSPADM

## 2021-01-15 NOTE — PROCEDURES
Findings:  1) Normal LVEDP  2) Minimal coronary artery disease      Contrast: 21 cc  Access: Right radial    Recommendations  1)GDMT for HFrEF  2)Increased coreg to 12.5 mg BID given elevated BP.  Can not increase lisinopril or add Aldactone given CKD and borderline high K+

## 2021-01-15 NOTE — PROGRESS NOTES
TRANSFER - IN REPORT:    Verbal report received from Josue Betancourt on [facility]  being received from procedural area for routine progression of care. Report consisted of patients Situation, Background, Assessment and Recommendations(SBAR). Information from the following report(s) OR Summary, MAR and Recent Results was reviewed with the receiving clinician. Opportunity for questions and clarification was provided. Assessment completed upon patients arrival to [address] [place] [address] and care assumed. Cardiac Cath Lab Recovery Arrival Note:    [facility] arrived to [facility] recovery area. Patient procedure= LHC. Patient on cardiac monitor, non-invasive blood pressure, SPO2 monitor. On  or O2 @ 2 lpm via NC.  IV  of NS on pump at 75 ml/hr. Patient status doing well without problems. Patient is A&Ox 3. Patient reports no c/o's. PROCEDURE SITE CHECK:    Procedure site:without any bleeding and hematoma, no pain/discomfort reported at procedure site. No change in patient status. Continue to monitor patient and status.

## 2021-01-15 NOTE — PROGRESS NOTES
Cardiac Cath Lab Procedure Area Arrival Note:    Cathy Hoyt arrived to Cardiac Cath Lab, Procedure Area. Patient identifiers verified with NAME and DATE OF BIRTH. Procedure verified with patient. Consent forms verified. Allergies verified. Patient informed of procedure and plan of care. Questions answered with review. Patient voiced understanding of procedure and plan of care. Patient on cardiac monitor, non-invasive blood pressure, SPO2 monitor. On RA or O2 @ 2 lpm via NC.  IV of NS on pump at 75 ml/hr. Patient status doing well without problems. Patient is A&Ox 4. Patient reports no pain. Patient medicated during procedure with orders obtained and verified by Dr. Iker Pelayo. Refer to patients Cardiac Cath Lab PROCEDURE REPORT for vital signs, assessment, status, and response during procedure, printed at end of case. Printed report on chart or scanned into chart. [id number] Report to Mineralist. VSS.  Pt taken to cath recovery

## 2021-01-15 NOTE — PROGRESS NOTES
Pt d/c'ed home with daughter. Discharge instructions given and understanding verbalized of follow up. Right radial dressing is dry and intact upon discharge, no bleeding or hematoma noted. Pt ambulated before discharge and tolerated well. Pt voices no c/o's upon discharge. Pt d/c'ed via w/c.

## 2021-01-15 NOTE — PROGRESS NOTES
Cardiac Cath Lab Recovery Arrival Note:      Law Mitchell arrived to Cardiac Cath Lab, Recovery Area. Staff introduced to patient. Patient identifiers verified with NAME and DATE OF BIRTH. Procedure verified with patient. Consent forms reviewed and signed by patient or authorized representative and verified. Allergies verified. Patient and family oriented to department. Patient and family informed of procedure and plan of care. Questions answered with review. Patient prepped for procedure, per orders from physician, prior to arrival.    Patient on cardiac monitor, non-invasive blood pressure, SPO2 monitor. On RA. Patient is A&Ox 4. Patient reports no c/o's. Patient in stretcher, in low position, with side rails up, call bell within reach, patient instructed to call if assistance as needed. Patient prep in: [address], [place] [address]. Patient family has pager # n/a  Family in: hospital.   Prep by: D. Wilton Moritz Dr. Perlman

## 2021-01-26 ENCOUNTER — TELEPHONE (OUTPATIENT)
Dept: CARDIOLOGY CLINIC | Age: 86
End: 2021-01-26

## 2021-01-26 NOTE — TELEPHONE ENCOUNTER
Returned call to patient. Two patient indentifiers verified. Pt was scheduled for a follow up appointment.      Future Appointments   Date Time Provider Dinah Sushila   2/1/2021 10:40 AM MD CHRISTIANO Palencia AMB

## 2021-01-26 NOTE — TELEPHONE ENCOUNTER
MD Melonie Whalen RN   Caller: Unspecified (Today, 12:04 PM)             Follow up within next 1-2 weeks.  shiv

## 2021-01-26 NOTE — TELEPHONE ENCOUNTER
Pt states she had a heart cath with Dr. Dee King and was told the office will f/u with her after procedure. Pt inquiring if she needs an appt.  Please advise      Phone:[phone]

## 2021-02-01 ENCOUNTER — OFFICE VISIT (OUTPATIENT)
Dept: CARDIOLOGY CLINIC | Age: 86
End: 2021-02-01
Payer: MEDICARE

## 2021-02-01 VITALS
BODY MASS INDEX: 34.04 KG/M2 | DIASTOLIC BLOOD PRESSURE: 82 MMHG | SYSTOLIC BLOOD PRESSURE: 140 MMHG | HEART RATE: 75 BPM | HEIGHT: 62 IN | WEIGHT: 185 LBS | OXYGEN SATURATION: 98 % | RESPIRATION RATE: 16 BRPM

## 2021-02-01 DIAGNOSIS — E78.2 MIXED HYPERLIPIDEMIA: ICD-10-CM

## 2021-02-01 DIAGNOSIS — Z82.49 FAMILY HISTORY OF EARLY CAD: ICD-10-CM

## 2021-02-01 DIAGNOSIS — I42.8 NON-ISCHEMIC CARDIOMYOPATHY (HCC): Primary | ICD-10-CM

## 2021-02-01 DIAGNOSIS — I10 BENIGN ESSENTIAL HTN: ICD-10-CM

## 2021-02-01 PROCEDURE — G8417 CALC BMI ABV UP PARAM F/U: HCPCS | Performed by: INTERNAL MEDICINE

## 2021-02-01 PROCEDURE — G8754 DIAS BP LESS 90: HCPCS | Performed by: INTERNAL MEDICINE

## 2021-02-01 PROCEDURE — G8753 SYS BP > OR = 140: HCPCS | Performed by: INTERNAL MEDICINE

## 2021-02-01 PROCEDURE — 1101F PT FALLS ASSESS-DOCD LE1/YR: CPT | Performed by: INTERNAL MEDICINE

## 2021-02-01 PROCEDURE — G8427 DOCREV CUR MEDS BY ELIG CLIN: HCPCS | Performed by: INTERNAL MEDICINE

## 2021-02-01 PROCEDURE — 99214 OFFICE O/P EST MOD 30 MIN: CPT | Performed by: INTERNAL MEDICINE

## 2021-02-01 PROCEDURE — G8400 PT W/DXA NO RESULTS DOC: HCPCS | Performed by: INTERNAL MEDICINE

## 2021-02-01 PROCEDURE — G0463 HOSPITAL OUTPT CLINIC VISIT: HCPCS | Performed by: INTERNAL MEDICINE

## 2021-02-01 PROCEDURE — G8536 NO DOC ELDER MAL SCRN: HCPCS | Performed by: INTERNAL MEDICINE

## 2021-02-01 PROCEDURE — G8432 DEP SCR NOT DOC, RNG: HCPCS | Performed by: INTERNAL MEDICINE

## 2021-02-01 PROCEDURE — 1090F PRES/ABSN URINE INCON ASSESS: CPT | Performed by: INTERNAL MEDICINE

## 2021-02-01 RX ORDER — CARVEDILOL 12.5 MG/1
12.5 TABLET ORAL 2 TIMES DAILY WITH MEALS
Qty: 60 TAB | Refills: 5 | Status: SHIPPED | OUTPATIENT
Start: 2021-02-01 | End: 2021-07-26

## 2021-02-01 NOTE — PROGRESS NOTES
[address]: Osei Tejeda  [phone]    History of Present Illness: Ms. Joanna Tirado is an 83 yo F non ischemic cardiomyopathy EF 20% by echo (cath 1/15/21 with no CAD), history of tobacco use, essential hypertension, mixed hyperlipidemia, stage 4 bladder cancer s/p resection and therapy in remission. On her last visit due to abnormal stress test and her severe cardiomyopathy, proceeded with a cardiac catheterization with Dr. Ann Marie Wylie on 01/15/2021 that demonstrated no significant coronary artery disease. He wanted to increase her Coreg to 12.5 mg bid, but she notes that this information did not get to the pharmacist, so she has still be taking 6.25 mg bid. Overall, she notes her breathing has been better. She denies any chest pain. She is only taking half dose of Lasix due to her kidney function. She is compensated on exam with clear lungs. She does have chronic 1+ lower extremity edema left greater than right. Assessment and Plan:    1. Severe cardiomyopathy, nonischemic, EF of 20%. Overall she is compensated and her cardiac catheterization did demonstrate no significant coronary artery disease. She is on the appropriate medical therapy with beta blocker, ACE inhibitor and no changes made. Spironolactone and Entresto were avoided given her kidney dysfunction. Will tentatively have her follow back with a same day echocardiogram in two months to reassess her LV function. We did talk briefly about ICD if her EF did not improve. She is trying to exercise regularly and encouraged this. She also has surgery on her jaw tomorrow and there is no cardiac contraindication to her proceeding with this. 2. Essential hypertension. Increased beta blocker as noted above. 3. Mixed hyperlipidemia. Tolerating statin. 4. Stage 4 bladder cancer in remission. 5. History of tobacco use. Quit many years ago. 6. Family history of early coronary artery disease.         She  has no past medical history on file.      All other systems negative except as above. PE  Vitals:    02/01/21 1030   BP: (!) 140/82   Pulse: 75   Resp: 16   SpO2: 98%   Weight: 185 lb (83.9 kg)   Height: 5' 2\" (1.575 m)    Body mass index is 33.84 kg/m². General appearance - alert, well appearing, and in no distress  Mental status - affect appropriate to mood  Eyes - sclera anicteric, moist mucous membranes  Neck - supple, no JVD  Chest - clear to auscultation, no wheezes, rales or rhonchi  Heart - normal rate, regular rhythm, normal S1, S2, no murmurs, rubs, clicks or gallops  Abdomen - soft, nontender, nondistended, no masses or organomegaly  Neurological - no focal deficit  Extremities - peripheral pulses normal, no pedal edema    Recent Labs:  No results found for: CHOL, CHOLX, CHLST, CHOLV, 572896, HDL, HDLP, LDL, LDLC, DLDLP, TGLX, TRIGL, TRIGP, CHHD, CHHDX  Lab Results   Component Value Date/Time    Creatinine 1.91 (H) 01/08/2021 12:36 PM     Lab Results   Component Value Date/Time    BUN 55 (H) 01/08/2021 12:36 PM     Lab Results   Component Value Date/Time    Potassium 5.0 01/08/2021 12:36 PM     No results found for: HBA1C, HGBE8, PZN8EBSC, KLX9YZYD  Lab Results   Component Value Date/Time    HGB 11.3 (L) 01/08/2021 12:36 PM     Lab Results   Component Value Date/Time    PLATELET 753 74/06/2938 12:36 PM       Reviewed:  No past medical history on file. Social History     Tobacco Use   Smoking Status Former Smoker   Smokeless Tobacco Never Used     Social History     Substance and Sexual Activity   Alcohol Use Yes     Allergies   Allergen Reactions    Sulfa (Sulfonamide Antibiotics) Itching       Current Outpatient Medications   Medication Sig    carvediloL (COREG) 12.5 mg tablet Take 0.5 Tabs by mouth two (2) times daily (with meals).  furosemide (LASIX) 20 mg tablet Take 1 Tab by mouth daily. (Patient taking differently: Take 20 mg by mouth daily.  Pt taking 0.5 tabs once a day)    Biotin 2,500 mcg cap Take 1 Cap by mouth daily.    aspirin 81 mg chewable tablet Take 1 Tab by mouth daily.  amoxicillin (AMOXIL) 500 mg capsule TAKE 4 CAPSULES BY MOUTH 1 HR PRIOR TO DENTAL PROCEDURE    cyanocobalamin 1,000 mcg tablet Take 1,000 mcg by mouth daily.  diclofenac EC (VOLTAREN) 75 mg EC tablet TAKE 1 TABLET BY MOUTH EVERY MORNING WITH FOOD    fexofenadine (ALLEGRA) 180 mg tablet Take 180 mg by mouth as needed.  lisinopriL (PRINIVIL, ZESTRIL) 20 mg tablet Take 20 mg by mouth daily.  calcium-cholecalciferol, D3, (CALTRATE 600+D) tablet Take 1 Tab by mouth daily.  co-enzyme Q-10 (Co Q-10) 100 mg capsule Take 100 mg by mouth daily.  multivitamin (ONE A DAY) tablet Take 1 Tab by mouth daily.  simvastatin (ZOCOR) 20 mg tablet Take  by mouth nightly.  cholecalciferol (Vitamin D3) 25 mcg (1,000 unit) cap Take  by mouth daily. No current facility-administered medications for this visit.         Sheila Richardson MD  [facility] heart and Vascular [place]  [address] [facility] [address]  [address]

## 2021-02-01 NOTE — LETTER
2/1/2021 10:13 PM 
 
Patient:  Lizbet Salinas YOB: 1935 Date of Visit: 2/1/2021 Link Moreau MD 
[address] [facility] [address] [place] [id number] Via Fax: [phone]: 
 
Dear Carlitos Davies, 
 
Ms. Monse Javed is an 83 yo F non ischemic cardiomyopathy EF 20% by echo (cath 1/15/21 with no CAD), history of tobacco use, essential hypertension, mixed hyperlipidemia, stage 4 bladder cancer s/p resection and therapy in remission. On her last visit due to abnormal stress test and her severe cardiomyopathy, proceeded with a cardiac catheterization with Dr. Rivka Stapleton on 01/15/2021 that demonstrated no significant coronary artery disease. He wanted to increase her Coreg to 12.5 mg bid, but she notes that this information did not get to the pharmacist, so she has still be taking 6.25 mg bid. Overall, she notes her breathing has been better. She denies any chest pain. She is only taking half dose of Lasix due to her kidney function. She is compensated on exam with clear lungs. She does have chronic 1+ lower extremity edema left greater than right. Assessment and Plan: 1. Severe cardiomyopathy, nonischemic, EF of 20%. Overall she is compensated and her cardiac catheterization did demonstrate no significant coronary artery disease. She is on the appropriate medical therapy with beta blocker, ACE inhibitor and no changes made. Spironolactone and Entresto were avoided given her kidney dysfunction. Will tentatively have her follow back with a same day echocardiogram in two months to reassess her LV function. We did talk briefly about ICD if her EF did not improve. She is trying to exercise regularly and encouraged this. She also has surgery on her jaw tomorrow and there is no cardiac contraindication to her proceeding with this. 2. Essential hypertension. Increased beta blocker as noted above. 3. Mixed hyperlipidemia. Tolerating statin. 4. Stage 4 bladder cancer in remission. 5. History of tobacco use. Quit many years ago. 6. Family history of early coronary artery disease. If you have questions, please do not hesitate to call me. Sincerely, Zoraida Kevin MD

## 2021-04-02 ENCOUNTER — ANCILLARY PROCEDURE (OUTPATIENT)
Dept: CARDIOLOGY CLINIC | Age: 86
End: 2021-04-02
Payer: MEDICARE

## 2021-04-02 ENCOUNTER — OFFICE VISIT (OUTPATIENT)
Dept: CARDIOLOGY CLINIC | Age: 86
End: 2021-04-02
Payer: MEDICARE

## 2021-04-02 VITALS
OXYGEN SATURATION: 97 % | HEART RATE: 74 BPM | WEIGHT: 185 LBS | HEIGHT: 62 IN | SYSTOLIC BLOOD PRESSURE: 150 MMHG | DIASTOLIC BLOOD PRESSURE: 96 MMHG | RESPIRATION RATE: 18 BRPM | BODY MASS INDEX: 34.04 KG/M2

## 2021-04-02 VITALS — HEIGHT: 62 IN | BODY MASS INDEX: 34.04 KG/M2 | WEIGHT: 185 LBS

## 2021-04-02 DIAGNOSIS — E78.2 MIXED HYPERLIPIDEMIA: ICD-10-CM

## 2021-04-02 DIAGNOSIS — I42.8 NON-ISCHEMIC CARDIOMYOPATHY (HCC): ICD-10-CM

## 2021-04-02 DIAGNOSIS — I10 BENIGN ESSENTIAL HTN: ICD-10-CM

## 2021-04-02 DIAGNOSIS — Z82.49 FAMILY HISTORY OF EARLY CAD: ICD-10-CM

## 2021-04-02 DIAGNOSIS — I42.8 NON-ISCHEMIC CARDIOMYOPATHY (HCC): Primary | ICD-10-CM

## 2021-04-02 LAB
ECHO AO ASC DIAM: 3.52 CM
ECHO AO ROOT DIAM: 3.71 CM
ECHO AV AREA PEAK VELOCITY: 2.32 CM2
ECHO AV AREA VTI: 2.25 CM2
ECHO AV AREA/BSA PEAK VELOCITY: 1.3 CM2/M2
ECHO AV AREA/BSA VTI: 1.2 CM2/M2
ECHO AV MEAN GRADIENT: 6.69 MMHG
ECHO AV PEAK GRADIENT: 12.73 MMHG
ECHO AV PEAK VELOCITY: 178.37 CM/S
ECHO AV VTI: 38.9 CM
ECHO IVC PROX: 2 CM
ECHO LA AREA 4C: 19.6 CM2
ECHO LA MAJOR AXIS: 4.56 CM
ECHO LA MINOR AXIS: 2.47 CM
ECHO LA VOL 2C: 51.01 ML (ref 22–52)
ECHO LA VOL 4C: 55.48 ML (ref 22–52)
ECHO LA VOL BP: 56.01 ML (ref 22–52)
ECHO LA VOL/BSA BIPLANE: 30.29 ML/M2 (ref 16–28)
ECHO LA VOLUME INDEX A2C: 27.58 ML/M2 (ref 16–28)
ECHO LA VOLUME INDEX A4C: 30 ML/M2 (ref 16–28)
ECHO LV E' LATERAL VELOCITY: 2.88 CM/S
ECHO LV E' SEPTAL VELOCITY: 3.63 CM/S
ECHO LV EDV A2C: 138.89 ML
ECHO LV EDV A4C: 137.69 ML
ECHO LV EDV BP: 138.42 ML (ref 56–104)
ECHO LV EDV INDEX A4C: 74.5 ML/M2
ECHO LV EDV INDEX BP: 74.9 ML/M2
ECHO LV EDV NDEX A2C: 75.1 ML/M2
ECHO LV EJECTION FRACTION A2C: 47 PERCENT
ECHO LV EJECTION FRACTION A4C: 43 PERCENT
ECHO LV EJECTION FRACTION BIPLANE: 44.6 PERCENT (ref 55–100)
ECHO LV ESV A2C: 73.35 ML
ECHO LV ESV A4C: 78.93 ML
ECHO LV ESV BP: 76.66 ML (ref 19–49)
ECHO LV ESV INDEX A2C: 39.7 ML/M2
ECHO LV ESV INDEX A4C: 42.7 ML/M2
ECHO LV ESV INDEX BP: 41.5 ML/M2
ECHO LV GLOBAL LONGITUDINAL STRAIN (GLS): -13.2 PERCENT
ECHO LV INTERNAL DIMENSION DIASTOLIC: 5.26 CM (ref 3.9–5.3)
ECHO LV INTERNAL DIMENSION SYSTOLIC: 4.15 CM
ECHO LV IVSD: 1.82 CM (ref 0.6–0.9)
ECHO LV MASS 2D: 448.4 G (ref 67–162)
ECHO LV MASS INDEX 2D: 242.5 G/M2 (ref 43–95)
ECHO LV POSTERIOR WALL DIASTOLIC: 1.73 CM (ref 0.6–0.9)
ECHO LVOT DIAM: 2.51 CM
ECHO LVOT PEAK GRADIENT: 2.8 MMHG
ECHO LVOT PEAK VELOCITY: 83.7 CM/S
ECHO LVOT SV: 87.6 ML
ECHO LVOT VTI: 17.71 CM
ECHO MV A VELOCITY: 91.33 CM/S
ECHO MV AREA PHT: 3.04 CM2
ECHO MV E DECELERATION TIME (DT): 249.5 MS
ECHO MV E VELOCITY: 46.69 CM/S
ECHO MV E/A RATIO: 0.51
ECHO MV E/E' LATERAL: 16.21
ECHO MV E/E' RATIO (AVERAGED): 14.54
ECHO MV E/E' SEPTAL: 12.86
ECHO MV PRESSURE HALF TIME (PHT): 72.35 MS
ECHO RV TAPSE: 1.85 CM (ref 1.5–2)
ECHO TV REGURGITANT MAX VELOCITY: 201.29 CM/S
ECHO TV REGURGITANT PEAK GRADIENT: 16.21 MMHG
GLOBAL LONGITUDINAL STRAIN 2 CHAMBER: -13.2 PERCENT
GLOBAL LONGITUDINAL STRAIN 4 CHAMBER: -13.4 PERCENT
GLOBAL LONGITUDINAL STRAIN LONG AXIS: -12.8 PERCENT
LA VOL DISK BP: 53.06 ML (ref 22–52)

## 2021-04-02 PROCEDURE — G8427 DOCREV CUR MEDS BY ELIG CLIN: HCPCS | Performed by: INTERNAL MEDICINE

## 2021-04-02 PROCEDURE — G8536 NO DOC ELDER MAL SCRN: HCPCS | Performed by: INTERNAL MEDICINE

## 2021-04-02 PROCEDURE — 99214 OFFICE O/P EST MOD 30 MIN: CPT | Performed by: INTERNAL MEDICINE

## 2021-04-02 PROCEDURE — G8753 SYS BP > OR = 140: HCPCS | Performed by: INTERNAL MEDICINE

## 2021-04-02 PROCEDURE — G8417 CALC BMI ABV UP PARAM F/U: HCPCS | Performed by: INTERNAL MEDICINE

## 2021-04-02 PROCEDURE — G8400 PT W/DXA NO RESULTS DOC: HCPCS | Performed by: INTERNAL MEDICINE

## 2021-04-02 PROCEDURE — G8510 SCR DEP NEG, NO PLAN REQD: HCPCS | Performed by: INTERNAL MEDICINE

## 2021-04-02 PROCEDURE — 93306 TTE W/DOPPLER COMPLETE: CPT | Performed by: INTERNAL MEDICINE

## 2021-04-02 PROCEDURE — G8755 DIAS BP > OR = 90: HCPCS | Performed by: INTERNAL MEDICINE

## 2021-04-02 PROCEDURE — 1090F PRES/ABSN URINE INCON ASSESS: CPT | Performed by: INTERNAL MEDICINE

## 2021-04-02 PROCEDURE — 1101F PT FALLS ASSESS-DOCD LE1/YR: CPT | Performed by: INTERNAL MEDICINE

## 2021-04-02 PROCEDURE — G0463 HOSPITAL OUTPT CLINIC VISIT: HCPCS | Performed by: INTERNAL MEDICINE

## 2021-04-02 RX ORDER — FUROSEMIDE 20 MG/1
10 TABLET ORAL
Qty: 30 TAB | Refills: 0
Start: 2021-04-02 | End: 2021-06-01 | Stop reason: SDUPTHER

## 2021-04-02 RX ORDER — CHLORHEXIDINE GLUCONATE 1.2 MG/ML
RINSE ORAL AS NEEDED
COMMUNITY
Start: 2021-02-02

## 2021-04-02 NOTE — LETTER
Patient:  Ruthy Saldivar YOB: 1935 Date of Visit: 4/2/2021 Dear José Miguel Velásquez MD 
[address] [place] [address] Via Fax: [phone]: Ms. Maia Sanchez is an 81 yo F non ischemic cardiomyopathy EF 45% (as low as 20% in the past) by echo (cath 1/15/21 with no CAD), history of tobacco use, essential hypertension, mixed hyperlipidemia, stage 4 bladder cancer s/p resection and therapy in remission. Overall she has been doing well. She does note her breathing is improved. No chest pain. No significant palpitations, lightheadedness or dizziness. She is having issues with her right shoulder rotator cuff and left hip. She has been compliant with her medications. She is compensated on exam with clear lungs and just trace lower extremity edema, left greater than right. Her echocardiogram today demonstrated improvement of her ejection fraction to 45% and just mild to moderate mitral regurgitation and we discussed the results. Assessment and Plan: 1. Severe nonischemic cardiomyopathy. Her ejection fraction is now 45% (improved from 20%). She is stable and compensated. Cardiac catheterization demonstrated no significant CAD. With her improvement in ejection fraction, she will not need an ICD and we discussed this. It is important for her to continue her beta blocker, ACE inhibitor. Spironolactone and Entresto were avoided given her kidney function. At this point, will have her follow back in six months. Consider repeat echocardiogram in one year. 2. Essential hypertension. Blood pressure is controlled. 3. Mixed hyperlipidemia. Tolerating statin. 4. Stage 4 bladder cancer, in remission. 5. History of tobacco use. Quit many years ago. 6. Family history of early coronary artery disease. If you have questions, please do not hesitate to call me. Sincerely, Shana Alejandro MD

## 2021-04-02 NOTE — PROGRESS NOTES
[facility] [address]: Cassidy Gaffney  [phone]    History of Present Illness: Ms. Sandy Bridges is an 81 yo F non ischemic cardiomyopathy EF 45% (as low as 20% in the past) by echo (cath 1/15/21 with no CAD), history of tobacco use, essential hypertension, mixed hyperlipidemia, stage 4 bladder cancer s/p resection and therapy in remission. Overall she has been doing well. She does note her breathing is improved. No chest pain. No significant palpitations, lightheadedness or dizziness. She is having issues with her right shoulder rotator cuff and left hip. She has been compliant with her medications. She is compensated on exam with clear lungs and just trace lower extremity edema, left greater than right. Her echocardiogram today demonstrated improvement of her ejection fraction to 45% and just mild to moderate mitral regurgitation and we discussed the results. Assessment and Plan:   1. Severe nonischemic cardiomyopathy. Her ejection fraction is now 45% (improved from 20%). She is stable and compensated. Cardiac catheterization demonstrated no significant CAD. With her improvement in ejection fraction, she will not need an ICD and we discussed this. It is important for her to continue her beta blocker, ACE inhibitor. Spironolactone and Entresto were avoided given her kidney function. At this point, will have her follow back in six months. Consider repeat echocardiogram in one year. 2. Essential hypertension. Blood pressure is controlled. 3. Mixed hyperlipidemia. Tolerating statin. 4. Stage 4 bladder cancer, in remission. 5. History of tobacco use. Quit many years ago. 6. Family history of early coronary artery disease. She  has no past medical history on file. All other systems negative except as above. PE  Vitals:    04/02/21 1002   BP: (!) 150/96   Pulse: 74   Resp: 18   SpO2: 97%   Weight: 185 lb (83.9 kg)   Height: 5' 2\" (1.575 m)    Body mass index is 33.84 kg/m². General appearance - alert, well appearing, and in no distress  Mental status - affect appropriate to mood  Eyes - sclera anicteric, moist mucous membranes  Neck - supple, no JVD  Chest - clear to auscultation, no wheezes, rales or rhonchi  Heart - normal rate, regular rhythm, normal S1, S2, no murmurs, rubs, clicks or gallops  Abdomen - soft, nontender, nondistended, no masses or organomegaly  Neurological - no focal deficit  Extremities - peripheral pulses normal, no pedal edema    Recent Labs:  No results found for: CHOL, CHOLX, CHLST, CHOLV, 749806, HDL, HDLP, LDL, LDLC, DLDLP, TGLX, TRIGL, TRIGP, CHHD, CHHDX  Lab Results   Component Value Date/Time    Creatinine 1.91 (H) 01/08/2021 12:36 PM     Lab Results   Component Value Date/Time    BUN 55 (H) 01/08/2021 12:36 PM     Lab Results   Component Value Date/Time    Potassium 5.0 01/08/2021 12:36 PM     No results found for: HBA1C, HGBE8, YVU9YVXA, QQV1SICF  Lab Results   Component Value Date/Time    HGB 11.3 (L) 01/08/2021 12:36 PM     Lab Results   Component Value Date/Time    PLATELET 864 95/50/6162 12:36 PM       Reviewed:  No past medical history on file. Social History     Tobacco Use   Smoking Status Former Smoker   Smokeless Tobacco Never Used     Social History     Substance and Sexual Activity   Alcohol Use Yes    Comment: rarely     Allergies   Allergen Reactions    Sulfa (Sulfonamide Antibiotics) Itching       Current Outpatient Medications   Medication Sig    chlorhexidine (PERIDEX) 0.12 % solution as needed.  carvediloL (COREG) 12.5 mg tablet Take 1 Tab by mouth two (2) times daily (with meals).  furosemide (LASIX) 20 mg tablet Take 1 Tab by mouth daily. (Patient taking differently: Take 20 mg by mouth daily. Pt taking 0.5 tabs once a day)    Biotin 2,500 mcg cap Take 1 Cap by mouth daily.  aspirin 81 mg chewable tablet Take 1 Tab by mouth daily.  cyanocobalamin 1,000 mcg tablet Take 1,000 mcg by mouth daily.     diclofenac EC (VOLTAREN) 75 mg EC tablet TAKE 1 TABLET BY MOUTH EVERY MORNING WITH FOOD    fexofenadine (ALLEGRA) 180 mg tablet Take 180 mg by mouth as needed.  lisinopriL (PRINIVIL, ZESTRIL) 20 mg tablet Take 20 mg by mouth daily.  calcium-cholecalciferol, D3, (CALTRATE 600+D) tablet Take 1 Tab by mouth daily.  co-enzyme Q-10 (Co Q-10) 100 mg capsule Take 100 mg by mouth daily.  multivitamin (ONE A DAY) tablet Take 1 Tab by mouth daily.  simvastatin (ZOCOR) 20 mg tablet Take  by mouth nightly.  cholecalciferol (Vitamin D3) 25 mcg (1,000 unit) cap Take  by mouth daily.  amoxicillin (AMOXIL) 500 mg capsule TAKE 4 CAPSULES BY MOUTH 1 HR PRIOR TO DENTAL PROCEDURE     No current facility-administered medications for this visit.         Andrea Helms MD  [facility] heart and Vascular [place]  [address] [facility] [address]  [address]

## 2021-06-01 RX ORDER — FUROSEMIDE 20 MG/1
10 TABLET ORAL
Qty: 30 TABLET | Refills: 1 | Status: SHIPPED | OUTPATIENT
Start: 2021-06-02 | End: 2021-11-17

## 2021-06-01 RX ORDER — FUROSEMIDE 40 MG/1
TABLET ORAL
Qty: 40 TABLET | Refills: 3 | OUTPATIENT
Start: 2021-06-01

## 2021-06-01 NOTE — TELEPHONE ENCOUNTER
Requested Prescriptions     Signed Prescriptions Disp Refills    furosemide (LASIX) 20 mg tablet 30 Tablet 1     Sig: Take 0.5 Tablets by mouth every Monday, Wednesday, Friday.      Authorizing Provider: Humberto Bellamy     Ordering User: Nicho Sadler     Refused Prescriptions Disp Refills    furosemide (LASIX) 40 mg tablet [Pharmacy Med Name: FUROSEMIDE 40 MG TABLET] 40 Tablet 3     Sig: TAKE 1 TABLET BY MOUTH EVERY DAY     Refused By: Nicho Sadler     Reason for Refusal: Medication dose changed       Last office visit 4/2/2021    Per Dr. Hany Martinez   Date Time Provider Dinah oCnti   10/13/2021  9:40 AM MD CHRISTIANO Rodas AMB

## 2021-07-01 RX ORDER — GUAIFENESIN 100 MG/5ML
LIQUID (ML) ORAL
Qty: 90 TABLET | Refills: 1 | Status: SHIPPED | OUTPATIENT
Start: 2021-07-01 | End: 2021-11-19

## 2021-07-01 NOTE — TELEPHONE ENCOUNTER
Requested Prescriptions     Signed Prescriptions Disp Refills    aspirin 81 mg chewable tablet 90 Tablet 1     Sig: TAKE 1 TABLET BY MOUTH EVERY DAY     Authorizing Provider: Nandini Peña     Ordering User: Glen Mayen       Last office visit 4/2/2021    Per Dr. Cheryl Marte   Date Time Provider Dinah Conti   10/13/2021  9:40 AM MD CHRISTIANO Briseno AMB

## 2021-07-26 RX ORDER — CARVEDILOL 12.5 MG/1
TABLET ORAL
Qty: 180 TABLET | Refills: 1 | Status: SHIPPED | OUTPATIENT
Start: 2021-07-26 | End: 2021-11-19

## 2021-07-26 NOTE — TELEPHONE ENCOUNTER
Requested Prescriptions     Signed Prescriptions Disp Refills    carvediloL (COREG) 12.5 mg tablet 180 Tablet 1     Sig: TAKE 1 TABLET BY MOUTH TWICE A DAY WITH MEALS     Authorizing Provider: Gena Moody     Ordering User: Urban Jamil       Last office visit 4/2/2021    Per Dr. Orlando Hope   Date Time Provider Dinah Conti   10/13/2021  9:40 AM MD CHRISTIANO Pino AMB

## 2021-08-23 RX ORDER — FUROSEMIDE 40 MG/1
TABLET ORAL
Qty: 40 TABLET | Refills: 3 | OUTPATIENT
Start: 2021-08-23

## 2021-10-20 ENCOUNTER — OFFICE VISIT (OUTPATIENT)
Dept: CARDIOLOGY CLINIC | Age: 86
End: 2021-10-20
Payer: MEDICARE

## 2021-10-20 VITALS
OXYGEN SATURATION: 97 % | HEIGHT: 62 IN | WEIGHT: 193.6 LBS | HEART RATE: 90 BPM | RESPIRATION RATE: 14 BRPM | BODY MASS INDEX: 35.63 KG/M2 | DIASTOLIC BLOOD PRESSURE: 100 MMHG | SYSTOLIC BLOOD PRESSURE: 180 MMHG

## 2021-10-20 DIAGNOSIS — I10 BENIGN ESSENTIAL HTN: ICD-10-CM

## 2021-10-20 DIAGNOSIS — Z82.49 FAMILY HISTORY OF EARLY CAD: ICD-10-CM

## 2021-10-20 DIAGNOSIS — E78.2 MIXED HYPERLIPIDEMIA: ICD-10-CM

## 2021-10-20 DIAGNOSIS — I42.8 NON-ISCHEMIC CARDIOMYOPATHY (HCC): Primary | ICD-10-CM

## 2021-10-20 PROCEDURE — G8417 CALC BMI ABV UP PARAM F/U: HCPCS | Performed by: INTERNAL MEDICINE

## 2021-10-20 PROCEDURE — 1090F PRES/ABSN URINE INCON ASSESS: CPT | Performed by: INTERNAL MEDICINE

## 2021-10-20 PROCEDURE — G8753 SYS BP > OR = 140: HCPCS | Performed by: INTERNAL MEDICINE

## 2021-10-20 PROCEDURE — G8510 SCR DEP NEG, NO PLAN REQD: HCPCS | Performed by: INTERNAL MEDICINE

## 2021-10-20 PROCEDURE — G8536 NO DOC ELDER MAL SCRN: HCPCS | Performed by: INTERNAL MEDICINE

## 2021-10-20 PROCEDURE — G8755 DIAS BP > OR = 90: HCPCS | Performed by: INTERNAL MEDICINE

## 2021-10-20 PROCEDURE — 99214 OFFICE O/P EST MOD 30 MIN: CPT | Performed by: INTERNAL MEDICINE

## 2021-10-20 PROCEDURE — 1101F PT FALLS ASSESS-DOCD LE1/YR: CPT | Performed by: INTERNAL MEDICINE

## 2021-10-20 PROCEDURE — G8427 DOCREV CUR MEDS BY ELIG CLIN: HCPCS | Performed by: INTERNAL MEDICINE

## 2021-10-20 PROCEDURE — G8400 PT W/DXA NO RESULTS DOC: HCPCS | Performed by: INTERNAL MEDICINE

## 2021-10-20 RX ORDER — DEXTROMETHORPHAN HYDROBROMIDE, GUAIFENESIN 5; 100 MG/5ML; MG/5ML
650 LIQUID ORAL DAILY
COMMUNITY

## 2021-10-20 NOTE — PROGRESS NOTES
[place] [address]: Wilberto Wilhelm  [phone]    History of Present Illness: Ms. Fede Hebert is an 81 yo F non ischemic cardiomyopathy EF 45% (as low as 20% in the past) by echo (cath 1/15/21 with no CAD), history of tobacco use, essential hypertension, mixed hyperlipidemia, stage 4 bladder cancer s/p resection and therapy in remission. Since her last visit, overall she has been doing okay. She denies any chest pain. Her breathing has been stable. She has been compliant with her medications. Her blood pressure was elevated here and she does need to follow this at home. She is compensated on exam with clear lungs. She does have 1+ lower extremity edema, left greater than right. Her echocardiogram last time demonstrated an EF of 45% and mild to moderate mitral regurgitation. Her weight is up slightly. Assessment and Plan:   1. Severe nonischemic cardiomyopathy, EF of 45%. It had been as low as 20% in the past.  She is stable and compensated. Cardiac catheterization demonstrated no significant CAD. She will follow her blood pressure closely and will make adjustments as needed. Otherwise, continue beta blocker, ACE inhibitor. Spironolactone and Entresto were avoided given her kidney function. She will follow up with a same day echocardiogram in six months. 2. Essential hypertension. Blood pressure is labile. It could be partly white coat hypertension, but if her blood pressure is also up on home readings, we will make adjustments as needed. 3. Mixed hyperlipidemia. Tolerating statin. 4. Stage 4 bladder cancer in remission. 5. History of tobacco use. Quit many years ago. 6. Family history of early coronary artery disease. She  has no past medical history on file. All other systems negative except as above.      PE  Vitals:    10/20/21 1421   BP: (!) 160/100   Pulse: 90   Resp: 14   SpO2: 97%   Weight: 193 lb 9.6 oz (87.8 kg)   Height: 5' 2\" (1.575 m)    Body mass index is 35.41 kg/m². General appearance - alert, well appearing, and in no distress  Mental status - affect appropriate to mood  Eyes - sclera anicteric, moist mucous membranes  Neck - supple, no JVD  Chest - clear to auscultation, no wheezes, rales or rhonchi  Heart - normal rate, regular rhythm, normal S1, S2, no murmurs, rubs, clicks or gallops  Abdomen - soft, nontender, nondistended, no masses or organomegaly  Neurological - no focal deficit  Extremities - peripheral pulses normal, no pedal edema    Recent Labs:  No results found for: CHOL, CHOLX, CHLST, CHOLV, 859106, HDL, HDLP, LDL, LDLC, DLDLP, TGLX, TRIGL, TRIGP, CHHD, CHHDX  Lab Results   Component Value Date/Time    Creatinine 1.91 (H) 01/08/2021 12:36 PM     Lab Results   Component Value Date/Time    BUN 55 (H) 01/08/2021 12:36 PM     Lab Results   Component Value Date/Time    Potassium 5.0 01/08/2021 12:36 PM     No results found for: HBA1C, OBD0QDTO, BBG1WRDD  Lab Results   Component Value Date/Time    HGB 11.3 (L) 01/08/2021 12:36 PM     Lab Results   Component Value Date/Time    PLATELET 593 32/56/7836 12:36 PM       Reviewed:  No past medical history on file. Social History     Tobacco Use   Smoking Status Former Smoker   Smokeless Tobacco Never Used     Social History     Substance and Sexual Activity   Alcohol Use Yes    Comment: rarely     Allergies   Allergen Reactions    Sulfa (Sulfonamide Antibiotics) Itching       Current Outpatient Medications   Medication Sig    acetaminophen (Tylenol Arthritis Pain) 650 mg TbER Take 650 mg by mouth daily.  carvediloL (COREG) 12.5 mg tablet TAKE 1 TABLET BY MOUTH TWICE A DAY WITH MEALS    aspirin 81 mg chewable tablet TAKE 1 TABLET BY MOUTH EVERY DAY    furosemide (LASIX) 20 mg tablet Take 0.5 Tablets by mouth every Monday, Wednesday, Friday.  chlorhexidine (PERIDEX) 0.12 % solution as needed.  Biotin 2,500 mcg cap Take 1 Cap by mouth daily.     amoxicillin (AMOXIL) 500 mg capsule TAKE 4 CAPSULES BY MOUTH 1 HR PRIOR TO DENTAL PROCEDURE    cyanocobalamin 1,000 mcg tablet Take 1,000 mcg by mouth daily.  fexofenadine (ALLEGRA) 180 mg tablet Take 180 mg by mouth as needed.  lisinopriL (PRINIVIL, ZESTRIL) 20 mg tablet Take 20 mg by mouth daily.  calcium-cholecalciferol, D3, (CALTRATE 600+D) tablet Take 1 Tab by mouth daily.  co-enzyme Q-10 (Co Q-10) 100 mg capsule Take 100 mg by mouth daily.  multivitamin (ONE A DAY) tablet Take 1 Tab by mouth daily.  simvastatin (ZOCOR) 20 mg tablet Take  by mouth nightly.  cholecalciferol (Vitamin D3) 25 mcg (1,000 unit) cap Take  by mouth daily.  diclofenac EC (VOLTAREN) 75 mg EC tablet TAKE 1 TABLET BY MOUTH EVERY MORNING WITH FOOD (Patient not taking: Reported on 10/20/2021)     No current facility-administered medications for this visit.        Bre Russell MD  [facility] heart and Vascular [place]  [facility] [address] [facility] [address]  [address]

## 2021-10-20 NOTE — LETTER
Patient:  Lizbet Salinas   YOB: 1935  Date of Visit: 10/20/2021    Link Moreau MD  [address]  [address] [address]  Via Fax: [phone]:    Dear Carlitos Davies    Ms. Monse Javed is an 81 yo F non ischemic cardiomyopathy EF 45% (as low as 20% in the past) by echo (cath 1/15/21 with no CAD), history of tobacco use, essential hypertension, mixed hyperlipidemia, stage 4 bladder cancer s/p resection and therapy in remission. Since her last visit, overall she has been doing okay. She denies any chest pain. Her breathing has been stable. She has been compliant with her medications. Her blood pressure was elevated here and she does need to follow this at home. She is compensated on exam with clear lungs. She does have 1+ lower extremity edema, left greater than right. Her echocardiogram last time demonstrated an EF of 45% and mild to moderate mitral regurgitation. Her weight is up slightly. Assessment and Plan:   1. Severe nonischemic cardiomyopathy, EF of 45%. It had been as low as 20% in the past.  She is stable and compensated. Cardiac catheterization demonstrated no significant CAD. She will follow her blood pressure closely and will make adjustments as needed. Otherwise, continue beta blocker, ACE inhibitor. Spironolactone and Entresto were avoided given her kidney function. She will follow up with a same day echocardiogram in six months. 2. Essential hypertension. Blood pressure is labile. It could be partly white coat hypertension, but if her blood pressure is also up on home readings, we will make adjustments as needed. 3. Mixed hyperlipidemia. Tolerating statin. 4. Stage 4 bladder cancer in remission. 5. History of tobacco use. Quit many years ago. 6. Family history of early coronary artery disease. If you have questions, please do not hesitate to call me.       Sincerely,      Anatoliy Ferreira MD

## 2021-11-17 RX ORDER — FUROSEMIDE 20 MG/1
TABLET ORAL
Qty: 30 TABLET | Refills: 1 | Status: SHIPPED | OUTPATIENT
Start: 2021-11-17 | End: 2022-05-13

## 2021-11-17 RX ORDER — FUROSEMIDE 40 MG/1
TABLET ORAL
Qty: 40 TABLET | Refills: 3 | OUTPATIENT
Start: 2021-11-17

## 2021-11-17 NOTE — TELEPHONE ENCOUNTER
Requested Prescriptions     Signed Prescriptions Disp Refills    furosemide (LASIX) 20 mg tablet 30 Tablet 1     Sig: TAKE 1/2 TABLET BY MOUTH EVERY MONDAY, WEDNESDAY, AND FRIDAY     Authorizing Provider: Wilfredo Saleem     Ordering User: TRINY Carpio     Refused Prescriptions Disp Refills    furosemide (LASIX) 40 mg tablet [Pharmacy Med Name: FUROSEMIDE 40 MG TABLET] 40 Tablet 3     Sig: TAKE 1 TABLET BY MOUTH EVERY DAY     Refused By: Margarita Strong     Reason for Refusal: Change not appropriate       Last office visit 10/20/21    Per Dr. Guajardo [place]   Date Time Provider Dinah Conti   4/20/2022 10:00 AM VIANNEY MAHONEY AMB   4/20/2022 10:40 AM MD CHRISTIANO Perez BS AMB

## 2021-11-19 RX ORDER — GUAIFENESIN 100 MG/5ML
LIQUID (ML) ORAL
Qty: 90 TABLET | Refills: 1 | Status: SHIPPED | OUTPATIENT
Start: 2021-11-19 | End: 2022-06-01

## 2021-11-19 RX ORDER — CARVEDILOL 12.5 MG/1
TABLET ORAL
Qty: 180 TABLET | Refills: 1 | Status: SHIPPED | OUTPATIENT
Start: 2021-11-19 | End: 2022-06-24

## 2021-11-19 NOTE — TELEPHONE ENCOUNTER
Requested Prescriptions     Signed Prescriptions Disp Refills    carvediloL (COREG) 12.5 mg tablet 180 Tablet 1     Sig: TAKE 1 TABLET BY MOUTH TWICE A DAY WITH MEALS     Authorizing Provider: Ar Muse     Ordering User: Tomi Matinicus aspirin 81 mg chewable tablet 90 Tablet 1     Sig: TAKE 1 TABLET BY MOUTH EVERY DAY     Authorizing Provider: Ar Muse     Ordering User: Marisabel Erazo       Last office visit 10/20/2021    Per Dr. Palak Jules   Date Time Provider Dinah Conti   4/20/2022 10:00 AM VIANNEY MAHONEY BS AMB   4/20/2022 10:40 AM Dulce Claude, MD CAVREY BS AMB

## 2022-04-20 ENCOUNTER — OFFICE VISIT (OUTPATIENT)
Dept: CARDIOLOGY CLINIC | Age: 87
End: 2022-04-20
Payer: MEDICARE

## 2022-04-20 ENCOUNTER — ANCILLARY PROCEDURE (OUTPATIENT)
Dept: CARDIOLOGY CLINIC | Age: 87
End: 2022-04-20
Payer: MEDICARE

## 2022-04-20 VITALS
OXYGEN SATURATION: 98 % | SYSTOLIC BLOOD PRESSURE: 138 MMHG | BODY MASS INDEX: 36.07 KG/M2 | RESPIRATION RATE: 16 BRPM | HEART RATE: 67 BPM | DIASTOLIC BLOOD PRESSURE: 80 MMHG | HEIGHT: 62 IN | WEIGHT: 196 LBS

## 2022-04-20 VITALS — BODY MASS INDEX: 36.07 KG/M2 | HEIGHT: 62 IN | WEIGHT: 196 LBS

## 2022-04-20 DIAGNOSIS — I10 BENIGN ESSENTIAL HTN: ICD-10-CM

## 2022-04-20 DIAGNOSIS — I42.8 NON-ISCHEMIC CARDIOMYOPATHY (HCC): Primary | ICD-10-CM

## 2022-04-20 DIAGNOSIS — Z82.49 FAMILY HISTORY OF EARLY CAD: ICD-10-CM

## 2022-04-20 DIAGNOSIS — E78.2 MIXED HYPERLIPIDEMIA: ICD-10-CM

## 2022-04-20 DIAGNOSIS — I42.8 NON-ISCHEMIC CARDIOMYOPATHY (HCC): ICD-10-CM

## 2022-04-20 LAB
ECHO AO ASC DIAM: 3.6 CM
ECHO AO ASCENDING AORTA INDEX: 1.89 CM/M2
ECHO AO ROOT DIAM: 3.5 CM
ECHO AO ROOT INDEX: 1.84 CM/M2
ECHO AV AREA PEAK VELOCITY: 1.8 CM2
ECHO AV AREA VTI: 2.1 CM2
ECHO AV AREA/BSA PEAK VELOCITY: 0.9 CM2/M2
ECHO AV AREA/BSA VTI: 1.1 CM2/M2
ECHO AV MEAN GRADIENT: 8 MMHG
ECHO AV MEAN VELOCITY: 1.4 M/S
ECHO AV PEAK GRADIENT: 14 MMHG
ECHO AV PEAK VELOCITY: 1.9 M/S
ECHO AV VELOCITY RATIO: 0.32
ECHO AV VTI: 38.9 CM
ECHO EST RA PRESSURE: 8 MMHG
ECHO LA DIAMETER INDEX: 2 CM/M2
ECHO LA DIAMETER: 3.8 CM
ECHO LA TO AORTIC ROOT RATIO: 1.09
ECHO LA VOL 2C: 90 ML (ref 22–52)
ECHO LA VOL 4C: 77 ML (ref 22–52)
ECHO LA VOL BP: 85 ML (ref 22–52)
ECHO LA VOL/BSA BIPLANE: 45 ML/M2 (ref 16–34)
ECHO LA VOLUME AREA LENGTH: 91 ML
ECHO LA VOLUME INDEX A2C: 47 ML/M2 (ref 16–34)
ECHO LA VOLUME INDEX A4C: 41 ML/M2 (ref 16–34)
ECHO LA VOLUME INDEX AREA LENGTH: 48 ML/M2 (ref 16–34)
ECHO LV E' LATERAL VELOCITY: 5 CM/S
ECHO LV E' SEPTAL VELOCITY: 5 CM/S
ECHO LV EDV A2C: 80 ML
ECHO LV EDV A4C: 98 ML
ECHO LV EDV BP: 89 ML (ref 56–104)
ECHO LV EDV INDEX A4C: 52 ML/M2
ECHO LV EDV INDEX BP: 47 ML/M2
ECHO LV EDV NDEX A2C: 42 ML/M2
ECHO LV EJECTION FRACTION A2C: 49 %
ECHO LV EJECTION FRACTION A4C: 51 %
ECHO LV EJECTION FRACTION BIPLANE: 51 % (ref 55–100)
ECHO LV ESV A2C: 41 ML
ECHO LV ESV A4C: 48 ML
ECHO LV ESV BP: 44 ML (ref 19–49)
ECHO LV ESV INDEX A2C: 22 ML/M2
ECHO LV ESV INDEX A4C: 25 ML/M2
ECHO LV ESV INDEX BP: 23 ML/M2
ECHO LV FRACTIONAL SHORTENING: 18 % (ref 28–44)
ECHO LV INTERNAL DIMENSION DIASTOLE INDEX: 2.37 CM/M2
ECHO LV INTERNAL DIMENSION DIASTOLIC: 4.5 CM (ref 3.9–5.3)
ECHO LV INTERNAL DIMENSION SYSTOLIC INDEX: 1.95 CM/M2
ECHO LV INTERNAL DIMENSION SYSTOLIC: 3.7 CM
ECHO LV IVSD: 1.6 CM (ref 0.6–0.9)
ECHO LV MASS 2D: 304.6 G (ref 67–162)
ECHO LV MASS INDEX 2D: 160.3 G/M2 (ref 43–95)
ECHO LV POSTERIOR WALL DIASTOLIC: 1.6 CM (ref 0.6–0.9)
ECHO LV RELATIVE WALL THICKNESS RATIO: 0.71
ECHO LVOT AREA: 5.3 CM2
ECHO LVOT AV VTI INDEX: 0.4
ECHO LVOT DIAM: 2.6 CM
ECHO LVOT MEAN GRADIENT: 1 MMHG
ECHO LVOT PEAK GRADIENT: 2 MMHG
ECHO LVOT PEAK VELOCITY: 0.6 M/S
ECHO LVOT STROKE VOLUME INDEX: 43 ML/M2
ECHO LVOT SV: 81.7 ML
ECHO LVOT VTI: 15.4 CM
ECHO MV A VELOCITY: 0.93 M/S
ECHO MV E DECELERATION TIME (DT): 203.8 MS
ECHO MV E VELOCITY: 0.54 M/S
ECHO MV E/A RATIO: 0.58
ECHO MV E/E' LATERAL: 10.8
ECHO MV E/E' RATIO (AVERAGED): 10.8
ECHO MV E/E' SEPTAL: 10.8
ECHO PULMONARY ARTERY END DIASTOLIC PRESSURE: 4 MMHG
ECHO PV MAX VELOCITY: 0.9 M/S
ECHO PV PEAK GRADIENT: 3 MMHG
ECHO PV REGURGITANT MAX VELOCITY: 1 M/S
ECHO RIGHT VENTRICULAR SYSTOLIC PRESSURE (RVSP): 41 MMHG
ECHO RV TAPSE: 2.2 CM (ref 1.7–?)
ECHO TV REGURGITANT MAX VELOCITY: 2.88 M/S
ECHO TV REGURGITANT PEAK GRADIENT: 33 MMHG

## 2022-04-20 PROCEDURE — G8400 PT W/DXA NO RESULTS DOC: HCPCS | Performed by: INTERNAL MEDICINE

## 2022-04-20 PROCEDURE — 93005 ELECTROCARDIOGRAM TRACING: CPT | Performed by: INTERNAL MEDICINE

## 2022-04-20 PROCEDURE — G8427 DOCREV CUR MEDS BY ELIG CLIN: HCPCS | Performed by: INTERNAL MEDICINE

## 2022-04-20 PROCEDURE — G8752 SYS BP LESS 140: HCPCS | Performed by: INTERNAL MEDICINE

## 2022-04-20 PROCEDURE — G8536 NO DOC ELDER MAL SCRN: HCPCS | Performed by: INTERNAL MEDICINE

## 2022-04-20 PROCEDURE — G0463 HOSPITAL OUTPT CLINIC VISIT: HCPCS | Performed by: INTERNAL MEDICINE

## 2022-04-20 PROCEDURE — 99214 OFFICE O/P EST MOD 30 MIN: CPT | Performed by: INTERNAL MEDICINE

## 2022-04-20 PROCEDURE — G8754 DIAS BP LESS 90: HCPCS | Performed by: INTERNAL MEDICINE

## 2022-04-20 PROCEDURE — 93306 TTE W/DOPPLER COMPLETE: CPT | Performed by: INTERNAL MEDICINE

## 2022-04-20 PROCEDURE — 93010 ELECTROCARDIOGRAM REPORT: CPT | Performed by: INTERNAL MEDICINE

## 2022-04-20 PROCEDURE — 1090F PRES/ABSN URINE INCON ASSESS: CPT | Performed by: INTERNAL MEDICINE

## 2022-04-20 PROCEDURE — G8417 CALC BMI ABV UP PARAM F/U: HCPCS | Performed by: INTERNAL MEDICINE

## 2022-04-20 PROCEDURE — G8510 SCR DEP NEG, NO PLAN REQD: HCPCS | Performed by: INTERNAL MEDICINE

## 2022-04-20 PROCEDURE — 1101F PT FALLS ASSESS-DOCD LE1/YR: CPT | Performed by: INTERNAL MEDICINE

## 2022-04-20 RX ORDER — LYSINE HCL 500 MG
1 TABLET ORAL DAILY
COMMUNITY

## 2022-04-20 NOTE — LETTER
Patient:  Nini Doherty   YOB: 1935  Date of Visit: 4/20/2022    Aleshia Yuen MD  [address]  [address] [place] [address]  Via Fax: [phone]:    Dear Shivani Ceja,    MsYaw Arenas is an 83 yo F non ischemic cardiomyopathy EF 45% (as low as 20% in the past) by echo (cath 1/15/21 with no CAD), history of tobacco use, essential hypertension, mixed hyperlipidemia, stage 4 bladder cancer s/p resection and therapy in remission. She has been doing well. Her breathing has been stable. No exertional chest pain. Sometimes when she sleeps on her left side she will feel some aching, but then she will move and this will resolve. She has been trying to get in some regular walking. She has been compliant with her medications. She does take Lasix Monday, Wednesday and Fridays. She is compensated on exam with clear lungs. She does have 1+ lower extremity edema left greater than right, but this has been chronic. Her echocardiogram today did demonstrate improvement in her EF to 55% with just mild mitral regurgitation. We discussed the results. Assessment and Plan:   1. Severe nonischemic cardiomyopathy. Her EF has now normalized to 55% (had been as low as 20% in the past and was 45% last time). She is stable and compensated. Cardiac catheterization demonstrated no CAD. Continue to focus on blood pressure control and continue beta blocker and ACE inhibitor. Spironolactone and Entresto in the past were avoided given her kidney function. Will have her follow back in six months. Consider repeat echocardiogram in one year. 2. Essential hypertension. Blood pressure is controlled. She has had white coat hypertension in the past.    3. Mixed hyperlipidemia. Tolerating statin. 4. Stage 4 bladder cancer, in remission. 5. History of tobacco use. Quit many years ago. 6. Family history of early coronary artery disease. 7. Venous insufficiency.   Will have her continue her Lasix, elevate her legs at rest, minimize salt intake, compression stockings prn       If you have questions, please do not hesitate to call me.     Sincerely,      Nory Hawkins MD

## 2022-04-20 NOTE — PROGRESS NOTES
[address]: Joshua Meléndez  [phone]    History of Present Illness: Ms. Uday Garcia is an 81 yo F non ischemic cardiomyopathy EF 45% (as low as 20% in the past) by echo (cath 1/15/21 with no CAD), history of tobacco use, essential hypertension, mixed hyperlipidemia, stage 4 bladder cancer s/p resection and therapy in remission. She has been doing well. Her breathing has been stable. No exertional chest pain. Sometimes when she sleeps on her left side she will feel some aching, but then she will move and this will resolve. She has been trying to get in some regular walking. She has been compliant with her medications. She does take Lasix Monday, Wednesday and Fridays. She is compensated on exam with clear lungs. She does have 1+ lower extremity edema left greater than right, but this has been chronic. Her echocardiogram today did demonstrate improvement in her EF to 55% with just mild mitral regurgitation. We discussed the results. Assessment and Plan:   1. Severe nonischemic cardiomyopathy. Her EF has now normalized to 55% (had been as low as 20% in the past and was 45% last time). She is stable and compensated. Cardiac catheterization demonstrated no CAD. Continue to focus on blood pressure control and continue beta blocker and ACE inhibitor. Spironolactone and Entresto in the past were avoided given her kidney function. Will have her follow back in six months. Consider repeat echocardiogram in one year. 2. Essential hypertension. Blood pressure is controlled. She has had white coat hypertension in the past.    3. Mixed hyperlipidemia. Tolerating statin. 4. Stage 4 bladder cancer, in remission. 5. History of tobacco use. Quit many years ago. 6. Family history of early coronary artery disease. 7. Venous insufficiency.   Will have her continue her Lasix, elevate her legs at rest, minimize salt intake, compression stockings prn       She  has no past medical history on file.      All other systems negative except as above. PE  Vitals:    04/20/22 1016   BP: 138/80   Pulse: 67   Resp: 16   SpO2: 98%   Weight: 196 lb (88.9 kg)   Height: 5' 2\" (1.575 m)    Body mass index is 35.85 kg/m². General appearance - alert, well appearing, and in no distress  Mental status - affect appropriate to mood  Eyes - sclera anicteric, moist mucous membranes  Neck - supple, no JVD  Chest - clear to auscultation, no wheezes, rales or rhonchi  Heart - normal rate, regular rhythm, normal S1, S2, no murmurs, rubs, clicks or gallops  Abdomen - soft, nontender, nondistended, no masses or organomegaly  Neurological - no focal deficit  Extremities - peripheral pulses normal, no pedal edema    Recent Labs:  No results found for: CHOL, CHOLX, CHLST, CHOLV, 324659, HDL, HDLP, LDL, LDLC, DLDLP, TGLX, TRIGL, TRIGP, CHHD, CHHDX  Lab Results   Component Value Date/Time    Creatinine 1.91 (H) 01/08/2021 12:36 PM     Lab Results   Component Value Date/Time    BUN 55 (H) 01/08/2021 12:36 PM     Lab Results   Component Value Date/Time    Potassium 5.0 01/08/2021 12:36 PM     No results found for: HBA1C, GPP8FSWN, [id number]  Lab Results   Component Value Date/Time    HGB 11.3 (L) 01/08/2021 12:36 PM     Lab Results   Component Value Date/Time    PLATELET 621 22/30/3939 12:36 PM       Reviewed:  No past medical history on file.   Social History     Tobacco Use   Smoking Status Former Smoker   Smokeless Tobacco Never Used     Social History     Substance and Sexual Activity   Alcohol Use Yes    Comment: rarely     Allergies   Allergen Reactions    Sulfa (Sulfonamide Antibiotics) Itching       Current Outpatient Medications   Medication Sig    carvediloL (COREG) 12.5 mg tablet TAKE 1 TABLET BY MOUTH TWICE A DAY WITH MEALS    aspirin 81 mg chewable tablet TAKE 1 TABLET BY MOUTH EVERY DAY    furosemide (LASIX) 20 mg tablet TAKE 1/2 TABLET BY MOUTH EVERY MONDAY, WEDNESDAY, AND FRIDAY    acetaminophen (Tylenol Arthritis Pain) 650 mg TbER Take 650 mg by mouth daily.  chlorhexidine (PERIDEX) 0.12 % solution as needed.  Biotin 2,500 mcg cap Take 1 Cap by mouth daily.  amoxicillin (AMOXIL) 500 mg capsule TAKE 4 CAPSULES BY MOUTH 1 HR PRIOR TO DENTAL PROCEDURE    cyanocobalamin 1,000 mcg tablet Take 1,000 mcg by mouth daily.  fexofenadine (ALLEGRA) 180 mg tablet Take 180 mg by mouth as needed.  lisinopriL (PRINIVIL, ZESTRIL) 20 mg tablet Take 20 mg by mouth daily.  calcium-cholecalciferol, D3, (CALTRATE 600+D) tablet Take 1 Tab by mouth daily.  co-enzyme Q-10 (Co Q-10) 100 mg capsule Take 100 mg by mouth daily.  multivitamin (ONE A DAY) tablet Take 1 Tab by mouth daily.  simvastatin (ZOCOR) 20 mg tablet Take  by mouth nightly.  cholecalciferol (Vitamin D3) 25 mcg (1,000 unit) cap Take  by mouth daily.  Calcium Carbonate-Vit D3-Min 600 mg calcium- 400 unit tab Take 1 Tablet by mouth daily.  diclofenac EC (VOLTAREN) 75 mg EC tablet TAKE 1 TABLET BY MOUTH EVERY MORNING WITH FOOD (Patient not taking: Reported on 10/20/2021)     No current facility-administered medications for this visit.        Fortino Obrien MD  [facility] heart and Vascular [place]  [address] [facility] [address]  [facility], [address]

## 2022-05-12 DIAGNOSIS — I87.2 VENOUS INSUFFICIENCY: Primary | ICD-10-CM

## 2022-05-13 RX ORDER — FUROSEMIDE 20 MG/1
TABLET ORAL
Qty: 30 TABLET | Refills: 1 | Status: SHIPPED | OUTPATIENT
Start: 2022-05-13 | End: 2022-10-25

## 2022-05-13 NOTE — TELEPHONE ENCOUNTER
Refill sent per VO per MD    Future Appointments   Date Time Provider Dinah Sushila   10/20/2022 10:40 AM MD CHRISTIANO Lakhani AMB

## 2022-06-01 DIAGNOSIS — I87.2 VENOUS INSUFFICIENCY: Primary | ICD-10-CM

## 2022-06-01 DIAGNOSIS — I42.8 NON-ISCHEMIC CARDIOMYOPATHY (HCC): ICD-10-CM

## 2022-06-01 RX ORDER — GUAIFENESIN 100 MG/5ML
LIQUID (ML) ORAL
Qty: 90 TABLET | Refills: 1 | Status: SHIPPED | OUTPATIENT
Start: 2022-06-01

## 2022-06-01 NOTE — TELEPHONE ENCOUNTER
Refilled per VO per MD    Future Appointments   Date Time Provider Dinah Reesei   10/20/2022 10:40 AM MD CHRISTIANO Charlton AMB

## 2022-06-24 DIAGNOSIS — I42.8 NON-ISCHEMIC CARDIOMYOPATHY (HCC): Primary | ICD-10-CM

## 2022-06-24 DIAGNOSIS — I42.0 DILATED CARDIOMYOPATHY (HCC): ICD-10-CM

## 2022-06-24 DIAGNOSIS — I20.0 UNSTABLE ANGINA (HCC): ICD-10-CM

## 2022-06-24 DIAGNOSIS — R00.0 TACHYCARDIA: ICD-10-CM

## 2022-06-24 RX ORDER — CARVEDILOL 12.5 MG/1
TABLET ORAL
Qty: 180 TABLET | Refills: 1 | Status: SHIPPED | OUTPATIENT
Start: 2022-06-24 | End: 2022-10-25

## 2022-06-24 NOTE — TELEPHONE ENCOUNTER
Refilled per vo per Md    Future Appointments   Date Time Provider Dinah Conti   10/20/2022 10:40 AM MD CHRISTIANO Reed AMB

## 2022-10-20 ENCOUNTER — OFFICE VISIT (OUTPATIENT)
Dept: CARDIOLOGY CLINIC | Age: 87
End: 2022-10-20
Payer: MEDICARE

## 2022-10-20 VITALS
WEIGHT: 194 LBS | SYSTOLIC BLOOD PRESSURE: 138 MMHG | OXYGEN SATURATION: 97 % | RESPIRATION RATE: 20 BRPM | DIASTOLIC BLOOD PRESSURE: 86 MMHG | BODY MASS INDEX: 35.7 KG/M2 | HEIGHT: 62 IN | HEART RATE: 77 BPM

## 2022-10-20 DIAGNOSIS — I87.2 VENOUS INSUFFICIENCY: ICD-10-CM

## 2022-10-20 DIAGNOSIS — I10 BENIGN ESSENTIAL HTN: ICD-10-CM

## 2022-10-20 DIAGNOSIS — E78.2 MIXED HYPERLIPIDEMIA: ICD-10-CM

## 2022-10-20 DIAGNOSIS — I42.8 NON-ISCHEMIC CARDIOMYOPATHY (HCC): Primary | ICD-10-CM

## 2022-10-20 PROCEDURE — G8427 DOCREV CUR MEDS BY ELIG CLIN: HCPCS | Performed by: INTERNAL MEDICINE

## 2022-10-20 PROCEDURE — G8400 PT W/DXA NO RESULTS DOC: HCPCS | Performed by: INTERNAL MEDICINE

## 2022-10-20 PROCEDURE — G8752 SYS BP LESS 140: HCPCS | Performed by: INTERNAL MEDICINE

## 2022-10-20 PROCEDURE — G8536 NO DOC ELDER MAL SCRN: HCPCS | Performed by: INTERNAL MEDICINE

## 2022-10-20 PROCEDURE — 1123F ACP DISCUSS/DSCN MKR DOCD: CPT | Performed by: INTERNAL MEDICINE

## 2022-10-20 PROCEDURE — 99214 OFFICE O/P EST MOD 30 MIN: CPT | Performed by: INTERNAL MEDICINE

## 2022-10-20 PROCEDURE — G8510 SCR DEP NEG, NO PLAN REQD: HCPCS | Performed by: INTERNAL MEDICINE

## 2022-10-20 PROCEDURE — G8754 DIAS BP LESS 90: HCPCS | Performed by: INTERNAL MEDICINE

## 2022-10-20 PROCEDURE — 1090F PRES/ABSN URINE INCON ASSESS: CPT | Performed by: INTERNAL MEDICINE

## 2022-10-20 PROCEDURE — 1101F PT FALLS ASSESS-DOCD LE1/YR: CPT | Performed by: INTERNAL MEDICINE

## 2022-10-20 PROCEDURE — G8417 CALC BMI ABV UP PARAM F/U: HCPCS | Performed by: INTERNAL MEDICINE

## 2022-10-20 RX ORDER — KETOCONAZOLE 20 MG/G
CREAM TOPICAL AS NEEDED
COMMUNITY
Start: 2022-10-11

## 2022-10-20 NOTE — LETTER
Patient:  Kingsley Mitchell   YOB: 1935  Date of Visit: 10/20/2022    Cayetano Diallo MD  [address]  [address] [address]  Via Fax: [phone]:    Dear Yissel Newton,    MsYaw Moss is an 83 yo F non ischemic cardiomyopathy EF 45% (as low as 20% in the past) by echo (cath 1/15/21 with no CAD), history of tobacco use, essential hypertension, mixed hyperlipidemia, stage 4 bladder cancer s/p resection and therapy in remission. Since her last visit, she continues to do well. She denies any exertional chest pain, shortness of breath, palpitations, lightheadedness or dizziness. She has been compliant with her medication. She is compensated on exam with clear lungs. She does have chronic 1+ lower extremity edema, left greater than right, but this is unchanged. Her echocardiogram earlier this year demonstrated normalization of her EF to 55% and just mild mitral regurgitation. Assessment and Plan:   1. Severe nonischemic cardiomyopathy. Her EF has normalized to 55 (as low as 20% in the past). Will have her follow back in six months with a same day echocardiogram.  This was nonischemic and cardiac catheterization in 2021 demonstrated no significant CAD. Continue to focus on blood pressure control. Continue beta blocker and ACE inhibitor. Spironolactone and Entresto in the past were avoided given her kidney function. 2. Essential hypertension. Blood pressure is controlled. History of white coat hypertension. 3. Mixed hyperlipidemia. Tolerating statin. 4. Stage 4 bladder cancer. In remission. 5. History of tobacco use. Quit many years ago. 6. Family history of early coronary artery disease. 7. Venous insufficiency. Taking Lasix, elevating legs at rest, minimizing salt intake and compression stockings prn      If you have questions, please do not hesitate to call me.     Sincerely,      Alina Braga MD

## 2022-10-20 NOTE — PROGRESS NOTES
[address]: Ansemlo De La Garza  [phone]    History of Present Illness: Ms. Raf Khanna is an 81 yo F non ischemic cardiomyopathy EF 45% (as low as 20% in the past) by echo (cath 1/15/21 with no CAD), history of tobacco use, essential hypertension, mixed hyperlipidemia, stage 4 bladder cancer s/p resection and therapy in remission. Since her last visit, she continues to do well. She denies any exertional chest pain, shortness of breath, palpitations, lightheadedness or dizziness. She has been compliant with her medication. She is compensated on exam with clear lungs. She does have chronic 1+ lower extremity edema, left greater than right, but this is unchanged. Her echocardiogram earlier this year demonstrated normalization of her EF to 55% and just mild mitral regurgitation. Assessment and Plan:   1. Severe nonischemic cardiomyopathy. Her EF has normalized to 55 (as low as 20% in the past). Will have her follow back in six months with a same day echocardiogram.  This was nonischemic and cardiac catheterization in 2021 demonstrated no significant CAD. Continue to focus on blood pressure control. Continue beta blocker and ACE inhibitor. Spironolactone and Entresto in the past were avoided given her kidney function. 2. Essential hypertension. Blood pressure is controlled. History of white coat hypertension. 3. Mixed hyperlipidemia. Tolerating statin. 4. Stage 4 bladder cancer. In remission. 5. History of tobacco use. Quit many years ago. 6. Family history of early coronary artery disease. 7. Venous insufficiency. Taking Lasix, elevating legs at rest, minimizing salt intake and compression stockings prn        She  has no past medical history on file. All other systems negative except as above. PE  Vitals:    10/20/22 1043   BP: 138/86   Pulse: 77   Resp: 20   SpO2: 97%   Weight: 194 lb (88 kg)   Height: 5' 2\" (1.575 m)      Body mass index is 35.48 kg/m².    General appearance - alert, well appearing, and in no distress  Mental status - affect appropriate to mood  Eyes - sclera anicteric, moist mucous membranes  Neck - supple, no JVD  Chest - clear to auscultation, no wheezes, rales or rhonchi  Heart - normal rate, regular rhythm, normal S1, S2, no murmurs, rubs, clicks or gallops  Abdomen - soft, nontender, nondistended, no masses or organomegaly  Neurological - no focal deficit  Extremities - peripheral pulses normal, no pedal edema    Recent Labs:  No results found for: CHOL, CHOLX, CHLST, CHOLV, 023354, HDL, HDLP, LDL, LDLC, DLDLP, TGLX, TRIGL, TRIGP, CHHD, CHHDX  Lab Results   Component Value Date/Time    Creatinine 1.91 (H) 01/08/2021 12:36 PM     Lab Results   Component Value Date/Time    BUN 55 (H) 01/08/2021 12:36 PM     Lab Results   Component Value Date/Time    Potassium 5.0 01/08/2021 12:36 PM     No results found for: HBA1C, CES5LNWK, SKC6BULC  Lab Results   Component Value Date/Time    HGB 11.3 (L) 01/08/2021 12:36 PM     Lab Results   Component Value Date/Time    PLATELET 688 89/67/0094 12:36 PM       Reviewed:  History reviewed. No pertinent past medical history. Social History     Tobacco Use   Smoking Status Former   Smokeless Tobacco Never     Social History     Substance and Sexual Activity   Alcohol Use Yes    Comment: rarely     Allergies   Allergen Reactions    Sulfa (Sulfonamide Antibiotics) Itching       Current Outpatient Medications   Medication Sig    ketoconazole (NIZORAL) 2 % topical cream as needed. carvediloL (COREG) 12.5 mg tablet TAKE 1 TABLET BY MOUTH TWICE A DAY WITH MEALS    aspirin 81 mg chewable tablet CHEW & SWALLOW 1 TABLET BY MOUTH EVERY DAY    furosemide (LASIX) 20 mg tablet TAKE 1/2 TABLET BY MOUTH EVERY MONDAY, WEDNESDAY, AND FRIDAY    Calcium Carbonate-Vit D3-Min 600 mg calcium- 400 unit tab Take 1 Tablet by mouth daily. acetaminophen (TYLENOL) 650 mg TbER Take 650 mg by mouth daily.     Biotin 2,500 mcg cap Take 1 Cap by mouth daily. amoxicillin (AMOXIL) 500 mg capsule TAKE 4 CAPSULES BY MOUTH 1 HR PRIOR TO DENTAL PROCEDURE    cyanocobalamin 1,000 mcg tablet Take 1,000 mcg by mouth daily. fexofenadine (ALLEGRA) 180 mg tablet Take 180 mg by mouth as needed. lisinopriL (PRINIVIL, ZESTRIL) 20 mg tablet Take 20 mg by mouth daily. calcium-cholecalciferol, D3, (CALTRATE 600+D) tablet Take 1 Tab by mouth daily. co-enzyme Q-10 (CO Q-10) 100 mg capsule Take 100 mg by mouth daily. multivitamin (ONE A DAY) tablet Take 1 Tab by mouth daily. simvastatin (ZOCOR) 20 mg tablet Take  by mouth nightly. cholecalciferol (VITAMIN D3) 25 mcg (1,000 unit) cap Take  by mouth daily. chlorhexidine (PERIDEX) 0.12 % solution as needed. (Patient not taking: Reported on 10/20/2022)    diclofenac EC (VOLTAREN) 75 mg EC tablet TAKE 1 TABLET BY MOUTH EVERY MORNING WITH FOOD (Patient not taking: No sig reported)     No current facility-administered medications for this visit.        Martha Edge MD  [facility] heart and Vascular [place]  [address] [facility] [address]  [facility], [address]

## 2022-10-25 DIAGNOSIS — R00.0 TACHYCARDIA: ICD-10-CM

## 2022-10-25 DIAGNOSIS — I87.2 VENOUS INSUFFICIENCY: ICD-10-CM

## 2022-10-25 DIAGNOSIS — I42.0 DILATED CARDIOMYOPATHY (HCC): ICD-10-CM

## 2022-10-25 DIAGNOSIS — I42.8 NON-ISCHEMIC CARDIOMYOPATHY (HCC): ICD-10-CM

## 2022-10-25 DIAGNOSIS — I20.0 UNSTABLE ANGINA (HCC): ICD-10-CM

## 2022-10-25 RX ORDER — FUROSEMIDE 20 MG/1
TABLET ORAL
Qty: 30 TABLET | Refills: 1 | Status: SHIPPED | OUTPATIENT
Start: 2022-10-25

## 2022-10-25 RX ORDER — CARVEDILOL 12.5 MG/1
TABLET ORAL
Qty: 180 TABLET | Refills: 1 | Status: SHIPPED | OUTPATIENT
Start: 2022-10-25

## 2022-10-25 NOTE — TELEPHONE ENCOUNTER
Refilled per VO per Md    Future Appointments   Date Time Provider Dinah Conti   4/24/2023 10:00 AM VIANNEY DALE AMB   4/24/2023 10:40 AM MD CHRISTIANO Mccarthy AMB

## 2022-11-27 DIAGNOSIS — I42.8 NON-ISCHEMIC CARDIOMYOPATHY (HCC): ICD-10-CM

## 2022-11-27 DIAGNOSIS — I87.2 VENOUS INSUFFICIENCY: ICD-10-CM

## 2022-11-30 RX ORDER — GUAIFENESIN 100 MG/5ML
LIQUID (ML) ORAL
Qty: 90 TABLET | Refills: 1 | Status: SHIPPED | OUTPATIENT
Start: 2022-11-30

## 2022-11-30 NOTE — TELEPHONE ENCOUNTER
Refilled per Vo per Md    Future Appointments   Date Time Provider Dinah Conti   4/24/2023 10:00 AM VIANNEY DALE AMB   4/24/2023 10:40 AM MD CHRISTIANO Kerr AMB

## 2023-04-24 ENCOUNTER — OFFICE VISIT (OUTPATIENT)
Dept: CARDIOLOGY CLINIC | Age: 88
End: 2023-04-24

## 2023-04-24 ENCOUNTER — ANCILLARY PROCEDURE (OUTPATIENT)
Dept: CARDIOLOGY CLINIC | Age: 88
End: 2023-04-24
Payer: MEDICARE

## 2023-04-24 VITALS
WEIGHT: 189 LBS | HEIGHT: 62 IN | BODY MASS INDEX: 34.78 KG/M2 | RESPIRATION RATE: 16 BRPM | DIASTOLIC BLOOD PRESSURE: 86 MMHG | OXYGEN SATURATION: 97 % | SYSTOLIC BLOOD PRESSURE: 138 MMHG | HEART RATE: 71 BPM

## 2023-04-24 VITALS
SYSTOLIC BLOOD PRESSURE: 138 MMHG | HEIGHT: 62 IN | DIASTOLIC BLOOD PRESSURE: 86 MMHG | WEIGHT: 189 LBS | BODY MASS INDEX: 34.78 KG/M2

## 2023-04-24 DIAGNOSIS — I42.8 NON-ISCHEMIC CARDIOMYOPATHY (HCC): Primary | ICD-10-CM

## 2023-04-24 DIAGNOSIS — I42.8 NON-ISCHEMIC CARDIOMYOPATHY (HCC): ICD-10-CM

## 2023-04-24 DIAGNOSIS — I10 BENIGN ESSENTIAL HTN: ICD-10-CM

## 2023-04-24 DIAGNOSIS — E78.2 MIXED HYPERLIPIDEMIA: ICD-10-CM

## 2023-04-24 DIAGNOSIS — I87.2 VENOUS INSUFFICIENCY: ICD-10-CM

## 2023-04-24 LAB
ECHO AO ASC DIAM: 3.9 CM
ECHO AO ASCENDING AORTA INDEX: 2.09 CM/M2
ECHO AO ROOT DIAM: 3.5 CM
ECHO AO ROOT INDEX: 1.87 CM/M2
ECHO AV AREA PEAK VELOCITY: 2 CM2
ECHO AV AREA VTI: 2.3 CM2
ECHO AV AREA/BSA PEAK VELOCITY: 1.1 CM2/M2
ECHO AV AREA/BSA VTI: 1.2 CM2/M2
ECHO AV MEAN GRADIENT: 8 MMHG
ECHO AV MEAN VELOCITY: 1.3 M/S
ECHO AV PEAK GRADIENT: 14 MMHG
ECHO AV PEAK VELOCITY: 1.9 M/S
ECHO AV VELOCITY RATIO: 0.37
ECHO AV VTI: 43.9 CM
ECHO EST RA PRESSURE: 3 MMHG
ECHO LA DIAMETER INDEX: 2.46 CM/M2
ECHO LA DIAMETER: 4.6 CM
ECHO LA TO AORTIC ROOT RATIO: 1.31
ECHO LA VOL 2C: 82 ML (ref 22–52)
ECHO LA VOL 4C: 70 ML (ref 22–52)
ECHO LA VOL BP: 76 ML (ref 22–52)
ECHO LA VOL/BSA BIPLANE: 41 ML/M2 (ref 16–34)
ECHO LA VOLUME AREA LENGTH: 77 ML
ECHO LA VOLUME INDEX A2C: 44 ML/M2 (ref 16–34)
ECHO LA VOLUME INDEX A4C: 37 ML/M2 (ref 16–34)
ECHO LA VOLUME INDEX AREA LENGTH: 41 ML/M2 (ref 16–34)
ECHO LV E' LATERAL VELOCITY: 8 CM/S
ECHO LV E' SEPTAL VELOCITY: 5 CM/S
ECHO LV EJECTION FRACTION A2C: 59 %
ECHO LV EJECTION FRACTION A4C: 54 %
ECHO LV EJECTION FRACTION BIPLANE: 57 % (ref 55–100)
ECHO LV FRACTIONAL SHORTENING: 27 % (ref 28–44)
ECHO LV INTERNAL DIMENSION DIASTOLE INDEX: 2.73 CM/M2
ECHO LV INTERNAL DIMENSION DIASTOLIC: 5.1 CM (ref 3.9–5.3)
ECHO LV INTERNAL DIMENSION SYSTOLIC INDEX: 1.98 CM/M2
ECHO LV INTERNAL DIMENSION SYSTOLIC: 3.7 CM
ECHO LV IVSD: 1.8 CM (ref 0.6–0.9)
ECHO LV MASS 2D: 438.1 G (ref 67–162)
ECHO LV MASS INDEX 2D: 234.3 G/M2 (ref 43–95)
ECHO LV POSTERIOR WALL DIASTOLIC: 1.8 CM (ref 0.6–0.9)
ECHO LV RELATIVE WALL THICKNESS RATIO: 0.71
ECHO LVOT AREA: 5.3 CM2
ECHO LVOT AV VTI INDEX: 0.43
ECHO LVOT CARDIAC OUTPUT: 3.1 LITER/MINUTE
ECHO LVOT CARDIAC OUTPUT: 3.1 LITER/MINUTE
ECHO LVOT CARDIAC OUTPUT: 3.3 LITER/MINUTE
ECHO LVOT CARDIAC OUTPUT: 3.3 LITER/MINUTE
ECHO LVOT CARDIAC OUTPUT: 3.5 LITER/MINUTE
ECHO LVOT CARDIAC OUTPUT: 3.5 LITER/MINUTE
ECHO LVOT DIAM: 2.6 CM
ECHO LVOT MEAN GRADIENT: 1 MMHG
ECHO LVOT PEAK GRADIENT: 2 MMHG
ECHO LVOT PEAK VELOCITY: 0.7 M/S
ECHO LVOT STROKE VOLUME INDEX: 53.9 ML/M2
ECHO LVOT SV: 100.8 ML
ECHO LVOT VTI: 19 CM
ECHO MV A VELOCITY: 0.93 M/S
ECHO MV E DECELERATION TIME (DT): 261.9 MS
ECHO MV E VELOCITY: 0.59 M/S
ECHO MV E/A RATIO: 0.63
ECHO MV E/E' LATERAL: 7.38
ECHO MV E/E' RATIO (AVERAGED): 9.59
ECHO MV E/E' SEPTAL: 11.8
ECHO RIGHT VENTRICULAR SYSTOLIC PRESSURE (RVSP): 38 MMHG
ECHO RV BASAL DIMENSION: 3.3 CM
ECHO RV FREE WALL PEAK S': 14 CM/S
ECHO RV TAPSE: 2.1 CM (ref 1.7–?)
ECHO TV REGURGITANT MAX VELOCITY: 2.97 M/S
ECHO TV REGURGITANT PEAK GRADIENT: 35 MMHG

## 2023-04-24 PROCEDURE — G0463 HOSPITAL OUTPT CLINIC VISIT: HCPCS | Performed by: INTERNAL MEDICINE

## 2023-04-24 PROCEDURE — 93005 ELECTROCARDIOGRAM TRACING: CPT | Performed by: INTERNAL MEDICINE

## 2023-04-24 PROCEDURE — 93306 TTE W/DOPPLER COMPLETE: CPT | Performed by: INTERNAL MEDICINE

## 2023-04-24 NOTE — PROGRESS NOTES
[address]: Nhan Tovar  [phone]    History of Present Illness: Ms. Bobby Perez is an 81 yo F non ischemic cardiomyopathy EF 45% (as low as 20% in the past) by echo (cath 1/15/21 with no CAD), history of tobacco use, essential hypertension, mixed hyperlipidemia, stage 4 bladder cancer s/p resection and therapy in remission. Since her last visit, overall she has been doing well. She denies any exertional chest pain, shortness of breath, palpitations, lightheadedness or dizziness. She does take her Lasix as needed. Her echocardiogram today checked out well with normalization of her EF 55-60% with just mild mitral regurgitation overall unchanged. She is compensated on exam with clear lungs. She does have chronic 1+ lower extremity edema, left greater than right. Her EKG was normal sinus rhythm, nonspecific ST-T wave abnormality. Assessment and Plan:   1. Severe nonischemic cardiomyopathy. This resolved and her EF has normalized (was as low as 20% in the past). Will continue her current cardiac regimen. Will have her follow back in six months. Cardiac catheterization in 2021 demonstrated no CAD. Continue beta blocker and ACE inhibitor. Spironolactone and Entresto were contraindicated due to kidney function. 2. Stage 4 bladder cancer, in remission. 3. Essential hypertension. Blood pressure is controlled. History of white coat hypertension. 4. Mixed hyperlipidemia. Tolerating statin. 5. Family history of early coronary artery disease. 6. History of tobacco use. Quit many years ago. 7. Venous insufficiency. Elevate legs, minimize salt intake, compression stockings prn. Continue to work on her weight. She will continue Lasix as needed. She  has no past medical history on file. All other systems negative except as above.      PE  Vitals:    04/24/23 1010   BP: 138/86   Pulse: 71   Resp: 16   SpO2: 97%   Weight: 189 lb (85.7 kg)   Height: 5' 2\" (1.575 m)      Body mass index is 34.57 kg/m². General appearance - alert, well appearing, and in no distress  Mental status - affect appropriate to mood  Eyes - sclera anicteric, moist mucous membranes  Neck - supple, no JVD  Chest - clear to auscultation, no wheezes, rales or rhonchi  Heart - normal rate, regular rhythm, normal S1, S2, no murmurs, rubs, clicks or gallops  Abdomen - soft, nontender, nondistended, no masses or organomegaly  Neurological - no focal deficit  Extremities - peripheral pulses normal, no pedal edema    Recent Labs:  No results found for: CHOL, CHOLX, CHLST, CHOLV, 008565, HDL, HDLP, LDL, LDLC, DLDLP, TGLX, TRIGL, TRIGP, CHHD, CHHDX  Lab Results   Component Value Date/Time    Creatinine 1.91 (H) 01/08/2021 12:36 PM     Lab Results   Component Value Date/Time    BUN 55 (H) 01/08/2021 12:36 PM     Lab Results   Component Value Date/Time    Potassium 5.0 01/08/2021 12:36 PM     No results found for: HBA1C, KVH3DPHN, PAO5ZYNT  Lab Results   Component Value Date/Time    HGB 11.3 (L) 01/08/2021 12:36 PM     Lab Results   Component Value Date/Time    PLATELET 606 42/95/1058 12:36 PM       Reviewed:  History reviewed. No pertinent past medical history. Social History     Tobacco Use   Smoking Status Former    Passive exposure: Never   Smokeless Tobacco Never     Social History     Substance and Sexual Activity   Alcohol Use Yes    Comment: rarely     Allergies   Allergen Reactions    Sulfa (Sulfonamide Antibiotics) Itching       Current Outpatient Medications   Medication Sig    aspirin 81 mg chewable tablet CHEW & SWALLOW 1 TABLET BY MOUTH EVERY DAY    carvediloL (COREG) 12.5 mg tablet TAKE 1 TABLET BY MOUTH TWICE A DAY WITH MEALS    furosemide (LASIX) 20 mg tablet TAKE 1/2 TABLET BY MOUTH EVERY MONDAY, WEDNESDAY, AND FRIDAY    ketoconazole (NIZORAL) 2 % topical cream as needed. Calcium Carbonate-Vit D3-Min 600 mg calcium- 400 unit tab Take 1 Tablet by mouth daily.     acetaminophen (TYLENOL) 650 mg TbER Take 1 Tablet by mouth two (2) times a day. 2 in am and 2 at night    Biotin 2,500 mcg cap Take 1 Cap by mouth daily. amoxicillin (AMOXIL) 500 mg capsule TAKE 4 CAPSULES BY MOUTH 1 HR PRIOR TO DENTAL PROCEDURE    cyanocobalamin 1,000 mcg tablet Take 1 Tablet by mouth daily. fexofenadine (ALLEGRA) 180 mg tablet Take 1 Tablet by mouth as needed. lisinopriL (PRINIVIL, ZESTRIL) 20 mg tablet Take 1 Tablet by mouth daily. calcium-cholecalciferol, D3, (CALTRATE 600+D) tablet Take 1 Tablet by mouth daily. co-enzyme Q-10 (CO Q-10) 100 mg capsule Take 1 Capsule by mouth daily. multivitamin (ONE A DAY) tablet Take 1 Tablet by mouth daily. simvastatin (ZOCOR) 20 mg tablet Take  by mouth nightly. cholecalciferol (VITAMIN D3) 25 mcg (1,000 unit) cap Take  by mouth daily. chlorhexidine (PERIDEX) 0.12 % solution as needed. (Patient not taking: Reported on 4/24/2023)    diclofenac EC (VOLTAREN) 75 mg EC tablet TAKE 1 TABLET BY MOUTH EVERY MORNING WITH FOOD (Patient not taking: Reported on 4/24/2023)     No current facility-administered medications for this visit.        Nigel Cordova MD  [facility] heart and Vascular [place]  [address] [facility] [address]  [address]

## 2023-05-09 DIAGNOSIS — I87.2 VENOUS INSUFFICIENCY (CHRONIC) (PERIPHERAL): ICD-10-CM

## 2023-05-09 DIAGNOSIS — I42.8 OTHER CARDIOMYOPATHIES (HCC): ICD-10-CM

## 2023-05-09 DIAGNOSIS — I20.0 UNSTABLE ANGINA (HCC): ICD-10-CM

## 2023-05-09 RX ORDER — CARVEDILOL 12.5 MG/1
TABLET ORAL
Qty: 180 TABLET | Refills: 1 | Status: SHIPPED | OUTPATIENT
Start: 2023-05-09

## 2023-05-09 RX ORDER — LORATADINE 10 MG
TABLET ORAL
Qty: 90 TABLET | Refills: 1 | Status: SHIPPED | OUTPATIENT
Start: 2023-05-09

## 2023-05-09 NOTE — TELEPHONE ENCOUNTER
Per VO of MD    LOV: 4/24/2023    Future Appointments   Date Time Provider Dani Su   10/24/2023 10:20 AM MD SHANNAN Bermeo BS AMB       Requested Prescriptions     Signed Prescriptions Disp Refills    carvedilol (COREG) 12.5 MG tablet 180 tablet 1     Sig: TAKE 1 TABLET BY MOUTH TWICE A DAY WITH MEALS     Authorizing Provider: Delio Valladares     Ordering User: JONATHAN SANDOVAL ASPIRIN ADULT LOW DOSE 81 MG chewable tablet 90 tablet 1     Sig: CHEW & SWALLOW 1 TABLET BY MOUTH EVERY DAY     Authorizing Provider: Delio Valladares     Ordering User: Cindi Arthur

## 2023-08-22 ENCOUNTER — HOSPITAL ENCOUNTER (EMERGENCY)
Facility: HOSPITAL | Age: 88
Discharge: HOME | End: 2023-08-22
Payer: MEDICARE

## 2023-08-22 VITALS
BODY MASS INDEX: 34.19 KG/M2 | DIASTOLIC BLOOD PRESSURE: 89 MMHG | SYSTOLIC BLOOD PRESSURE: 189 MMHG | RESPIRATION RATE: 16 BRPM | HEART RATE: 76 BPM | OXYGEN SATURATION: 96 % | WEIGHT: 186.95 LBS | TEMPERATURE: 98.2 F

## 2023-08-22 DIAGNOSIS — I10 ESSENTIAL HYPERTENSION: ICD-10-CM

## 2023-08-22 DIAGNOSIS — K13.79 BLEEDING IN MOUTH: Primary | ICD-10-CM

## 2023-08-22 PROCEDURE — 6370000000 HC RX 637 (ALT 250 FOR IP): Performed by: PHYSICIAN ASSISTANT

## 2023-08-22 PROCEDURE — 99283 EMERGENCY DEPT VISIT LOW MDM: CPT

## 2023-08-22 RX ORDER — LISINOPRIL 20 MG/1
20 TABLET ORAL
Status: COMPLETED | OUTPATIENT
Start: 2023-08-22 | End: 2023-08-22

## 2023-08-22 RX ADMIN — LISINOPRIL 20 MG: 20 TABLET ORAL at 08:28

## 2023-08-22 NOTE — DISCHARGE INSTRUCTIONS
Your appointment is at 9:30 this morning. Will be seeing Dr. Caryn Buchanan. Please go directly to their office.   If you have worsening of the bleeding in the meantime you need to return to the emergency department

## 2023-08-22 NOTE — ED NOTES
Patient stable GCS15  Medications given   Discharge summary explained and understood      Lindy Anthony RN  08/22/23 [id number]

## 2023-09-12 ENCOUNTER — TELEPHONE (OUTPATIENT)
Age: 88
End: 2023-09-12

## 2023-09-12 NOTE — TELEPHONE ENCOUNTER
Pt called to ask if she should stop taking the low dose aspirin per the er afia           Pt # [phone]

## 2023-09-12 NOTE — TELEPHONE ENCOUNTER
Telephone call made to patient. Two patient identifiers verified. Pt went to ED and had a bleed in the back of the throat. They sent her to an ENT, which had her stop aspirin. They could not find the source of the bleed and sent her to GI. They didn't know if she needed to stop it and said for her to call Dr. Roderick Lara. She saw her pcp and her bp had been high the whole time. He doubled her lisinopril to 40 mg bid. She said it's still high. I told her that since he is managing it, to call him and have him make adjustments.

## 2023-09-13 NOTE — TELEPHONE ENCOUNTER
Heather Brock MD  to Me        9/13/23 12:33 PM   No CAD on cath, so can stop aspirin. thx     Telephone call made to patient. Two patient identifiers verified. Let her know she can stop aspirin.

## 2023-10-24 ENCOUNTER — OFFICE VISIT (OUTPATIENT)
Age: 88
End: 2023-10-24
Payer: MEDICARE

## 2023-10-24 VITALS
WEIGHT: 183.8 LBS | DIASTOLIC BLOOD PRESSURE: 80 MMHG | BODY MASS INDEX: 33.82 KG/M2 | OXYGEN SATURATION: 95 % | HEIGHT: 62 IN | HEART RATE: 80 BPM | SYSTOLIC BLOOD PRESSURE: 146 MMHG

## 2023-10-24 DIAGNOSIS — E78.2 MIXED HYPERLIPIDEMIA: ICD-10-CM

## 2023-10-24 DIAGNOSIS — I10 BENIGN ESSENTIAL HTN: ICD-10-CM

## 2023-10-24 DIAGNOSIS — I42.8 NON-ISCHEMIC CARDIOMYOPATHY (HCC): Primary | ICD-10-CM

## 2023-10-24 DIAGNOSIS — Z82.49 FAMILY HISTORY OF EARLY CAD: ICD-10-CM

## 2023-10-24 PROCEDURE — 99214 OFFICE O/P EST MOD 30 MIN: CPT | Performed by: INTERNAL MEDICINE

## 2023-10-24 RX ORDER — FAMOTIDINE 20 MG/1
20 TABLET, FILM COATED ORAL 2 TIMES DAILY
COMMUNITY
Start: 2023-09-01

## 2023-10-24 RX ORDER — LISINOPRIL 40 MG/1
40 TABLET ORAL DAILY
COMMUNITY
Start: 2023-08-29

## 2023-10-24 ASSESSMENT — PATIENT HEALTH QUESTIONNAIRE - PHQ9
SUM OF ALL RESPONSES TO PHQ QUESTIONS 1-9: 0
SUM OF ALL RESPONSES TO PHQ QUESTIONS 1-9: 0
1. LITTLE INTEREST OR PLEASURE IN DOING THINGS: 0
SUM OF ALL RESPONSES TO PHQ QUESTIONS 1-9: 0
2. FEELING DOWN, DEPRESSED OR HOPELESS: 0
SUM OF ALL RESPONSES TO PHQ9 QUESTIONS 1 & 2: 0
SUM OF ALL RESPONSES TO PHQ QUESTIONS 1-9: 0

## 2023-10-24 NOTE — PROGRESS NOTES
daily    carvedilol (COREG) 12.5 MG tablet TAKE 1 TABLET BY MOUTH TWICE A DAY WITH MEALS    acetaminophen (TYLENOL) 650 MG extended release tablet Take 1 tablet by mouth daily    amoxicillin (AMOXIL) 500 MG capsule TAKE 4 CAPSULES BY MOUTH 1 HR PRIOR TO DENTAL PROCEDURE    Biotin 2.5 MG CAPS Take 1 capsule by mouth daily    calcium carb-cholecalciferol 600-10 MG-MCG TABS per tab Take 1 tablet by mouth daily    chlorhexidine (PERIDEX) 0.12 % solution as needed    vitamin D 25 MCG (1000 UT) CAPS Take by mouth daily    coenzyme Q10 100 MG CAPS capsule Take 1 capsule by mouth daily    cyanocobalamin 1000 MCG tablet Take 1 tablet by mouth daily    fexofenadine (ALLEGRA) 180 MG tablet Take 1 tablet by mouth as needed    furosemide (LASIX) 20 MG tablet TAKE 1/2 TABLET BY MOUTH EVERY MONDAY, WEDNESDAY, AND FRIDAY    ketoconazole (NIZORAL) 2 % cream as needed    lisinopril (PRINIVIL;ZESTRIL) 20 MG tablet Take 2 tablets by mouth daily    simvastatin (ZOCOR) 20 MG tablet Take by mouth    diclofenac (VOLTAREN) 75 MG EC tablet TAKE 1 TABLET BY MOUTH EVERY MORNING WITH FOOD (Patient not taking: Reported on 10/24/2023)     No current facility-administered medications for this visit.        Delmy Sanchez MD  [facility] heart and Vascular [place]  [address] [place] [address] [address]  [facility], [address] [place] [address]

## 2023-11-07 ENCOUNTER — TELEPHONE (OUTPATIENT)
Age: 88
End: 2023-11-07

## 2023-11-07 DIAGNOSIS — I10 BENIGN ESSENTIAL HTN: Primary | ICD-10-CM

## 2023-11-07 RX ORDER — AMLODIPINE BESYLATE 5 MG/1
5 TABLET ORAL DAILY
Qty: 30 TABLET | Refills: 1 | Status: SHIPPED | OUTPATIENT
Start: 2023-11-07

## 2023-11-07 NOTE — TELEPHONE ENCOUNTER
Telephone call made to patient. Two patient identifiers verified. Talked about adding Amlodipine 5 mg daily and taking bp for 7 days and send us a log with updated bp. Verified understanding. Requested Prescriptions     Signed Prescriptions Disp Refills    amLODIPine (NORVASC) 5 MG tablet 30 tablet 1     Sig: Take 1 tablet by mouth daily     Authorizing Provider: Mikey Zazueta     Ordering User:  Tresa De Leon per Dr. Joshua Marie.

## 2023-11-07 NOTE — TELEPHONE ENCOUNTER
----- Message from Pura Mondragon MD sent at 11/6/2023 11:22 PM EST -----  Please let pt know Bps were mildly elevated. Can add norvasc 5 mg once daily (may have some LE edema). Update us with Bps in 1 week.  thx

## 2023-11-13 ENCOUNTER — PATIENT MESSAGE (OUTPATIENT)
Age: 88
End: 2023-11-13

## 2023-11-13 DIAGNOSIS — I10 BENIGN ESSENTIAL HTN: ICD-10-CM

## 2023-11-14 RX ORDER — LOSARTAN POTASSIUM AND HYDROCHLOROTHIAZIDE 25; 100 MG/1; MG/1
1 TABLET ORAL DAILY
Qty: 30 TABLET | Refills: 1 | Status: SHIPPED | OUTPATIENT
Start: 2023-11-14 | End: 2023-11-15 | Stop reason: SINTOL

## 2023-11-14 RX ORDER — AMLODIPINE BESYLATE 5 MG/1
5 TABLET ORAL DAILY
Qty: 90 TABLET | Refills: 1 | Status: SHIPPED | OUTPATIENT
Start: 2023-11-14

## 2023-11-14 NOTE — TELEPHONE ENCOUNTER
From: Gina Sutherland  To: Dr. Anrdeea Lanierness: 11/13/2023 3:03 PM EST  Subject: Gina Sutherland BP Log    Please find a phot of the latest log attached.

## 2023-11-14 NOTE — TELEPHONE ENCOUNTER
Telephone call made to patient. Two patient identifiers verified. Discussed stopping Linsinopril and starting Losartan hctz 100-25mg. She said she wasn't taking the furosemide as much because the swelling was coming down and she felt too dry. I told her if she completely stops taking it to make sure she updates us on this. She will send a log in a week and get her labs drawn. Verified Understanding.

## 2023-11-15 DIAGNOSIS — I10 BENIGN ESSENTIAL HTN: Primary | ICD-10-CM

## 2023-11-15 RX ORDER — LISINOPRIL 40 MG/1
40 TABLET ORAL DAILY
Qty: 30 TABLET | Refills: 1 | Status: SHIPPED | OUTPATIENT
Start: 2023-11-15

## 2023-11-15 RX ORDER — HYDROCHLOROTHIAZIDE 25 MG/1
25 TABLET ORAL EVERY MORNING
Qty: 30 TABLET | Refills: 1 | Status: SHIPPED | OUTPATIENT
Start: 2023-11-15

## 2023-11-15 NOTE — TELEPHONE ENCOUNTER
Requested Prescriptions     Signed Prescriptions Disp Refills    lisinopril (PRINIVIL;ZESTRIL) 40 MG tablet 30 tablet 1     Sig: Take 1 tablet by mouth daily     Authorizing Provider: Fabricio Hubbard     Ordering User: ANNA BERNAL    hydroCHLOROthiazide (HYDRODIURIL) 25 MG tablet 30 tablet 1     Sig: Take 1 tablet by mouth every morning     Authorizing Provider: Fabricio Hubbard     Ordering User:  Kian Box per Dr. Twyla Levin.     Future Appointments   Date Time Provider [address]  [address]   4/17/2024 11:00 AM [facility] ECHO 2 SHANNAN NATARAJAN   4/17/2024 11:40 AM MD SHANNAN Strong AMB

## 2023-11-23 ENCOUNTER — PATIENT MESSAGE (OUTPATIENT)
Age: 88
End: 2023-11-23

## 2023-11-24 DIAGNOSIS — I10 BENIGN ESSENTIAL HTN: ICD-10-CM

## 2023-11-24 LAB
ANION GAP SERPL CALC-SCNC: 4 MMOL/L (ref 5–15)
BUN SERPL-MCNC: 45 MG/DL (ref 6–20)
BUN/CREAT SERPL: 27 (ref 12–20)
CALCIUM SERPL-MCNC: 9.7 MG/DL (ref 8.5–10.1)
CHLORIDE SERPL-SCNC: 108 MMOL/L (ref 97–108)
CO2 SERPL-SCNC: 26 MMOL/L (ref 21–32)
CREAT SERPL-MCNC: 1.69 MG/DL (ref 0.55–1.02)
GLUCOSE SERPL-MCNC: 112 MG/DL (ref 65–100)
POTASSIUM SERPL-SCNC: 5.3 MMOL/L (ref 3.5–5.1)
SODIUM SERPL-SCNC: 138 MMOL/L (ref 136–145)

## 2023-11-24 NOTE — TELEPHONE ENCOUNTER
Rajendra Land MD  You; Pricilla Mcelroy RN 3 hours ago (10:15 AM)       Please let pt know Bps look good. At this point, can update us on BP on an as needed basis. 1-2 times a week check on BP is ok.  thx

## 2023-11-27 ENCOUNTER — TELEPHONE (OUTPATIENT)
Age: 88
End: 2023-11-27

## 2023-11-27 NOTE — TELEPHONE ENCOUNTER
----- Message from Bernarda Hood MD sent at 11/26/2023 11:17 PM EST -----  Please let pt know labs were overall unchanged. Cr still up at 1.6 (though 1.9 in past); need to make sure staying hydrated and follow up closely with nephrologist. Potassium slightly up; minimize dietary potassium intake.  Keep same meds thx

## 2023-11-28 DIAGNOSIS — I20.0 UNSTABLE ANGINA (HCC): ICD-10-CM

## 2023-11-28 DIAGNOSIS — I42.8 OTHER CARDIOMYOPATHIES (HCC): ICD-10-CM

## 2023-11-28 RX ORDER — CARVEDILOL 12.5 MG/1
TABLET ORAL
Qty: 180 TABLET | Refills: 3 | Status: SHIPPED | OUTPATIENT
Start: 2023-11-28

## 2023-11-28 NOTE — TELEPHONE ENCOUNTER
Requested Prescriptions     Signed Prescriptions Disp Refills    carvedilol (COREG) 12.5 MG tablet 180 tablet 3     Sig: TAKE 1 TABLET BY MOUTH TWICE A DAY WITH MEALS     Authorizing Provider: Bernarda Hood     Ordering User:  Jackie Argueta per Md    Future Appointments   Date Time Provider [address]   4/17/2024 11:00 AM [facility] E[facility] 2 SHANNAN NATARAJAN   4/17/2024 11:40 AM MD SHANNAN Núñez AMB

## 2023-12-08 DIAGNOSIS — I10 BENIGN ESSENTIAL HTN: ICD-10-CM

## 2023-12-08 RX ORDER — HYDROCHLOROTHIAZIDE 25 MG/1
25 TABLET ORAL EVERY MORNING
Qty: 90 TABLET | Refills: 1 | Status: SHIPPED | OUTPATIENT
Start: 2023-12-08

## 2023-12-08 NOTE — PROGRESS NOTES
Requested Prescriptions     Signed Prescriptions Disp Refills    hydroCHLOROthiazide (HYDRODIURIL) 25 MG tablet 90 tablet 1     Sig: Take 1 tablet by mouth every morning     VO per MD    Future Appointments   Date Time Provider [address] [place] [address]   4/17/2024 11:00 AM [facility] E[facility] 2 SHANNAN NATARAJAN   4/17/2024 11:40 AM MD SHANNAN Soliz AMB

## 2024-01-03 RX ORDER — CALCIUM CARBONATE 500(1250)
500 TABLET ORAL DAILY
COMMUNITY

## 2024-01-03 RX ORDER — CETIRIZINE HYDROCHLORIDE 10 MG/1
10 TABLET ORAL AS NEEDED
COMMUNITY

## 2024-01-04 ENCOUNTER — ANESTHESIA (OUTPATIENT)
Facility: HOSPITAL | Age: 89
End: 2024-01-04
Payer: MEDICARE

## 2024-01-04 ENCOUNTER — ANESTHESIA EVENT (OUTPATIENT)
Facility: HOSPITAL | Age: 89
End: 2024-01-04
Payer: MEDICARE

## 2024-01-04 ENCOUNTER — HOSPITAL ENCOUNTER (OUTPATIENT)
Facility: HOSPITAL | Age: 89
Setting detail: OUTPATIENT SURGERY
Discharge: HOME | End: 2024-01-04
Attending: INTERNAL MEDICINE | Admitting: INTERNAL MEDICINE
Payer: MEDICARE

## 2024-01-04 VITALS
SYSTOLIC BLOOD PRESSURE: 161 MMHG | OXYGEN SATURATION: 97 % | BODY MASS INDEX: 33.49 KG/M2 | HEART RATE: 63 BPM | WEIGHT: 182 LBS | TEMPERATURE: 98.2 F | DIASTOLIC BLOOD PRESSURE: 63 MMHG | HEIGHT: 62 IN | RESPIRATION RATE: 17 BRPM

## 2024-01-04 PROCEDURE — 3700000001 HC ADD 15 MINUTES (ANESTHESIA): Performed by: INTERNAL MEDICINE

## 2024-01-04 PROCEDURE — 7100000010 HC PHASE II RECOVERY - FIRST 15 MIN: Performed by: INTERNAL MEDICINE

## 2024-01-04 PROCEDURE — 3700000000 HC ANESTHESIA ATTENDED CARE: Performed by: INTERNAL MEDICINE

## 2024-01-04 PROCEDURE — 6360000002 HC RX W HCPCS: Performed by: NURSE ANESTHETIST, CERTIFIED REGISTERED

## 2024-01-04 PROCEDURE — 88305 TISSUE EXAM BY PATHOLOGIST: CPT

## 2024-01-04 PROCEDURE — 2580000003 HC RX 258: Performed by: NURSE ANESTHETIST, CERTIFIED REGISTERED

## 2024-01-04 PROCEDURE — 3600007512: Performed by: INTERNAL MEDICINE

## 2024-01-04 PROCEDURE — 2580000003 HC RX 258: Performed by: INTERNAL MEDICINE

## 2024-01-04 PROCEDURE — 3600007502: Performed by: INTERNAL MEDICINE

## 2024-01-04 PROCEDURE — 2500000003 HC RX 250 WO HCPCS: Performed by: NURSE ANESTHETIST, CERTIFIED REGISTERED

## 2024-01-04 PROCEDURE — 2709999900 HC NON-CHARGEABLE SUPPLY: Performed by: INTERNAL MEDICINE

## 2024-01-04 RX ORDER — SODIUM CHLORIDE 0.9 % (FLUSH) 0.9 %
5-40 SYRINGE (ML) INJECTION PRN
Status: DISCONTINUED | OUTPATIENT
Start: 2024-01-04 | End: 2024-01-04 | Stop reason: HOSPADM

## 2024-01-04 RX ORDER — SODIUM CHLORIDE 9 MG/ML
25 INJECTION, SOLUTION INTRAVENOUS PRN
Status: DISCONTINUED | OUTPATIENT
Start: 2024-01-04 | End: 2024-01-04 | Stop reason: HOSPADM

## 2024-01-04 RX ORDER — 0.9 % SODIUM CHLORIDE 0.9 %
INTRAVENOUS SOLUTION INTRAVENOUS PRN
Status: DISCONTINUED | OUTPATIENT
Start: 2024-01-04 | End: 2024-01-04 | Stop reason: SDUPTHER

## 2024-01-04 RX ORDER — LIDOCAINE HYDROCHLORIDE 20 MG/ML
INJECTION, SOLUTION EPIDURAL; INFILTRATION; INTRACAUDAL; PERINEURAL PRN
Status: DISCONTINUED | OUTPATIENT
Start: 2024-01-04 | End: 2024-01-04 | Stop reason: SDUPTHER

## 2024-01-04 RX ORDER — SODIUM CHLORIDE 0.9 % (FLUSH) 0.9 %
5-40 SYRINGE (ML) INJECTION EVERY 12 HOURS SCHEDULED
Status: DISCONTINUED | OUTPATIENT
Start: 2024-01-04 | End: 2024-01-04 | Stop reason: HOSPADM

## 2024-01-04 RX ADMIN — SODIUM CHLORIDE 25 ML: 9 INJECTION, SOLUTION INTRAVENOUS at 08:29

## 2024-01-04 RX ADMIN — PROPOFOL 50 MG: 10 INJECTION, EMULSION INTRAVENOUS at 08:56

## 2024-01-04 RX ADMIN — LIDOCAINE HYDROCHLORIDE 100 MG: 20 INJECTION, SOLUTION EPIDURAL; INFILTRATION; INTRACAUDAL; PERINEURAL at 08:50

## 2024-01-04 RX ADMIN — SODIUM CHLORIDE 300 ML: 900 INJECTION, SOLUTION INTRAVENOUS at 09:04

## 2024-01-04 RX ADMIN — PROPOFOL 50 MG: 10 INJECTION, EMULSION INTRAVENOUS at 08:50

## 2024-01-04 ASSESSMENT — ENCOUNTER SYMPTOMS
SHORTNESS OF BREATH: 1
DYSPNEA ACTIVITY LEVEL: NO INTERVAL CHANGE

## 2024-01-04 ASSESSMENT — PAIN - FUNCTIONAL ASSESSMENT: PAIN_FUNCTIONAL_ASSESSMENT: 0-10

## 2024-01-04 NOTE — PROGRESS NOTES
Endoscopy Case End Note:    0856:  Procedure scope was pre-cleaned, per protocol, at bedside by ROSELIA Motley RN.      0858:  Report received from anesthesia - SOPHIE Krishnamurthy CRNA.  See anesthesia flowsheet for intra-procedure vital signs and events.    0859:  Glasses returned to patient.

## 2024-01-04 NOTE — PROGRESS NOTES
ARRIVAL INFORMATION:  Verified patient name and date of birth, scheduled procedure, and informed consent.     : Anayeli (daughter) contact number: [phone]  Physician and staff can share information with the .     Belongings with patient include:  Clothing,Glasses, Jewelry (2 pair of earrings on patient)    GI FOCUSED ASSESSMENT:  Neuro: Awake, alert, oriented x4  Respiratory: even and unlabored   GI: soft and non-distended  EKG Rhythm: sinus rhythm with PVCs    Education:Reviewed general discharge instructions and  information.  The risks and benefits of the bite block have been explained to patient.  Patient verbalizes understanding.

## 2024-01-04 NOTE — H&P
Gastroenterology Outpatient History and Physical    Patient: Lexie Joyce    Physician: Praneeth Waldrop MD    Chief Complaint:  emesis/hematemesis  History of Present Illness: 87yo F with resolved  emesis/hematemesis    History:  Past Medical History:   Diagnosis Date    Anemia     Arthritis     hands, generalized    Bradycardia     Cancer (HCC)     bladder, urostomy    Hyperlipidemia     Hypertension     Skin cancer 2023    on right ear, removed      Past Surgical History:   Procedure Laterality Date    BLADDER REMOVAL      2010?, uterus, ovaries removed at same time    BUNIONECTOMY Bilateral     COLONOSCOPY      ENDOSCOPY, COLON, DIAGNOSTIC  01/2024    EYE SURGERY Bilateral     cataracts removed    HYSTERECTOMY (CERVIX STATUS UNKNOWN)      this was done with bladder removal    JOINT REPLACEMENT Right     total hip replacement    OTHER SURGICAL HISTORY      nasal surgery to decrease nosebloods, cauterization done w/ clamp      Social History     Socioeconomic History    Marital status:      Spouse name: None    Number of children: None    Years of education: None    Highest education level: None   Tobacco Use    Smoking status: Former     Types: Cigarettes    Smokeless tobacco: Never    Tobacco comments:     Patient cannot remember when she quit smoking   Vaping Use    Vaping Use: Never used   Substance and Sexual Activity    Alcohol use: Yes     Comment: rarely    Drug use: Never      Family History   Problem Relation Age of Onset    Diabetes Mother     Cervical Cancer Mother     Hypertension Father     Heart Disease Father     Heart Attack Father     Other Father         black lung disease;     There is no problem list on file for this patient.      Allergies:   Allergies   Allergen Reactions    Sulfa Antibiotics Itching     Medications:   Prior to Admission medications    Medication Sig Start Date End Date Taking? Authorizing Provider   cetirizine (ZYRTEC) 10 MG tablet Take 1 tablet by mouth as

## 2024-01-04 NOTE — ANESTHESIA PRE PROCEDURE
Department of Anesthesiology  Preprocedure Note       Name:  Lexie Joyce   Age:  88 y.o.  :  1935                                          MRN:  [id number]         Date:  2024      Surgeon: Surgeon(s):  Praneeth Waldrop MD    Procedure: Procedure(s):  EGD WITH BIOPSY    Medications prior to admission:   Prior to Admission medications    Medication Sig Start Date End Date Taking? Authorizing Provider   cetirizine (ZYRTEC) 10 MG tablet Take 1 tablet by mouth as needed for Allergies   Yes Donn Vega MD   calcium carbonate (OSCAL) 500 MG TABS tablet Take 1 tablet by mouth daily   Yes Donn Vega MD   Cholecalciferol (VITAMIN D-3 PO) Take 1 tablet by mouth daily She takes the gummies   Yes Donn Vega MD   hydroCHLOROthiazide (HYDRODIURIL) 25 MG tablet Take 1 tablet by mouth every morning 23   Harish Lorenz MD   carvedilol (COREG) 12.5 MG tablet TAKE 1 TABLET BY MOUTH TWICE A DAY WITH MEALS 23   Harish Lorenz MD   lisinopril (PRINIVIL;ZESTRIL) 40 MG tablet Take 1 tablet by mouth daily 11/15/23   Harish Lorenz MD   amLODIPine (NORVASC) 5 MG tablet Take 1 tablet by mouth daily 23   Harish Lorenz MD   acetaminophen (TYLENOL) 650 MG extended release tablet Take 1 tablet by mouth daily    Automatic Reconciliation, Ar   amoxicillin (AMOXIL) 500 MG capsule TAKE 4 CAPSULES BY MOUTH 1 HR PRIOR TO DENTAL PROCEDURE 11/15/20   Automatic Reconciliation, Ar   Biotin 2.5 MG CAPS Take 1 capsule by mouth daily    Automatic Reconciliation, Ar   coenzyme Q10 100 MG CAPS capsule Take 1 capsule by mouth daily    Automatic Reconciliation, Ar   cyanocobalamin 1000 MCG tablet Take 1 tablet by mouth daily    Automatic Reconciliation, Ar   fexofenadine (ALLEGRA) 180 MG tablet Take 1 tablet by mouth as needed    Automatic Reconciliation, Ar   simvastatin (ZOCOR) 20 MG tablet Take 1 tablet by mouth nightly    Automatic Reconciliation, Ar       Current medications:    No current

## 2024-01-04 NOTE — ANESTHESIA POSTPROCEDURE EVALUATION
Department of Anesthesiology  Postprocedure Note    Patient: Lexie Joyce  MRN: [id number]  YOB: 1935  Date of evaluation: 1/4/2024    Procedure Summary       Date: 01/04/24 Room / Location: [facility] 04 / [facility] END[facility]COPY    Anesthesia Start: 0844 Anesthesia Stop: 0904    Procedure: EGD WITH BIOPSY (Upper GI Region) Diagnosis:       Hematemesis, unspecified whether nausea present      (Hematemesis, unspecified whether nausea present [K92.0])    Surgeons: Praneeth Waldrop MD Responsible Provider: Ranjith Watson MD    Anesthesia Type: MAC ASA Status: 3            Anesthesia Type: No value filed.    Alondra Phase I: Alondra Score: 10    Alondra Phase II:      Anesthesia Post Evaluation    Patient location during evaluation: PACU  Patient participation: complete - patient participated  Level of consciousness: sleepy but conscious and responsive to verbal stimuli  Pain score: 0  Airway patency: patent  Nausea & Vomiting: no vomiting and no nausea  Cardiovascular status: blood pressure returned to baseline and hemodynamically stable  Respiratory status: acceptable  Hydration status: stable  Pain management: satisfactory to patient    No notable events documented.

## 2024-01-04 NOTE — OP NOTE
NAME:  Lexie Joyce   :   1935   MRN:   [id number]     Date/Time:  2024 9:04 AM    Esophagogastroduodenoscopy (EGD) Procedure Note    Procedure: Esophagogastroduodenoscopy with biopsy    Indication: Emesis/hematemesis  Pre-operative Diagnosis: see indication above  Post-operative Diagnosis: see findings below  :  Praneeth Waldrop MD  Referring Provider:   -Juan Harkins MD    Exam:  Airway: clear, no airway problems anticipated  Heart: RRR, without gallops or rubs  Lungs: clear bilaterally without wheezes, crackles, or rhonchi  Abdomen: soft, nontender, nondistended, bowel sounds present  Mental Status: awake, alert and oriented to person, place and time     Anethesia/Sedation:  MAC anesthesia Propofol 100mg IV  Procedure Details   After informed consent was obtained for the procedure, with all risks and benefits of procedure explained the patient was taken to the endoscopy suite and placed in the left lateral decubitus position.  Following sequential administration of sedation as per above, the [id number] gastroscope was inserted into the mouth and advanced under direct vision to second portion of the duodenum.  A careful inspection was made as the gastroscope was withdrawn, including a retroflexed view of the proximal stomach; findings and interventions are described below.                  Findings:    -Minimal distal esophageal erythema (redness) without irregularity at gastroesophageal junction; biopsied to exclude inflammation  -Minimal scattered gastric erythema (redness); biopsied to exclude inflammation  -Normal duodenum  -No ulcers, masses, or vascular lesions noted    Therapies:  biopsy of esophagus; biopsy of stomach   Specimens: #1 gastric; #2 gastroesophageal junction    EBL:  None.         Complications:   None; patient tolerated the procedure well.           Impression:    -Minimal distal esophageal erythema (redness) without irregularity at gastroesophageal junction; biopsied to

## 2024-01-04 NOTE — DISCHARGE INSTRUCTIONS
Lexie Joyce  [id number]  1935    EGD DISCHARGE INSTRUCTIONS  Discomfort:  Sore throat- throat lozenges or warm salt water gargle  redness at IV site- apply warm compress to area; if redness or soreness persist- contact your physician  Gaseous discomfort- walking, belching will help relieve any discomfort  You may not operate a vehicle for 12 hours  You may not engage in an occupation involving machinery or appliances for rest of today  You may not drink alcoholic beverages for at least 12 hours  Avoid making any critical decisions for at least 24 hour  DIET  You may have minimal sips at this time-- do not eat or drink for two hours.  You may eat and drink after 0915am today  You may resume your regular diet - however -  remember your colon is empty and a heavy meal will produce gas.   Avoid these foods:  vegetables, fried / greasy foods, carbonated drinks    MEDICATIONS:  [unfilled]    ACTIVITY  You may resume your normal daily activities until tomorrow AM;  Spend the remainder of the day resting -  avoid any strenuous activity.  CALL M.D.  ANY SIGN OF   Increasing pain, nausea, vomiting  Abdominal distension (swelling)  New increased bleeding (oral or rectal)  Fever (chills)  Pain in chest area  Bloody discharge from nose or mouth  Shortness of breath    IMPRESSION:  -Minimal distal esophageal erythema (redness) without irregularity at gastroesophageal junction; biopsied to exclude inflammation  -Minimal scattered gastric erythema (redness); biopsied to exclude inflammation  -Normal duodenum  -No ulcers, masses, or vascular lesions noted    Follow-up Instructions:   Call Dr. Waldrop for the results of procedure / biopsy in 7-10 days   Telephone # [phone]    Praneeth Waldrop MD  Patient Education on Sedation / Analgesia Administered for Procedure      For 24 hours after general anesthesia or intravenous analgesia / sedation:  Have someone responsible help you with your care  Limit your activities  Do not drive

## 2024-05-08 DIAGNOSIS — I10 BENIGN ESSENTIAL HTN: ICD-10-CM

## 2024-05-08 RX ORDER — HYDROCHLOROTHIAZIDE 25 MG/1
25 TABLET ORAL EVERY MORNING
Qty: 90 TABLET | Refills: 1 | Status: SHIPPED | OUTPATIENT
Start: 2024-05-08

## 2024-05-08 RX ORDER — AMLODIPINE BESYLATE 5 MG/1
5 TABLET ORAL DAILY
Qty: 90 TABLET | Refills: 1 | Status: SHIPPED | OUTPATIENT
Start: 2024-05-08

## 2024-05-08 NOTE — TELEPHONE ENCOUNTER
Requested Prescriptions     Signed Prescriptions Disp Refills    hydroCHLOROthiazide (HYDRODIURIL) 25 MG tablet 90 tablet 1     Sig: TAKE 1 TABLET BY MOUTH EVERY DAY IN THE MORNING     Authorizing Provider: MEL LIND     Ordering User: ANNA BERNAL    amLODIPine (NORVASC) 5 MG tablet 90 tablet 1     Sig: TAKE 1 TABLET BY MOUTH EVERY DAY     Authorizing Provider: MEL LIDN     Ordering User: ANNA BERNAL     VO per MD    Future Appointments   Date Time Provider Department Center   5/10/2024  3:00 PM [facility] ECHO 2 SHANNAN NATARAJAN   5/10/2024  3:20 PM Mel Lind MD [facility]

## 2024-05-10 ENCOUNTER — ANCILLARY PROCEDURE (OUTPATIENT)
Age: 89
End: 2024-05-10
Payer: MEDICARE

## 2024-05-10 ENCOUNTER — OFFICE VISIT (OUTPATIENT)
Age: 89
End: 2024-05-10
Payer: MEDICARE

## 2024-05-10 VITALS
WEIGHT: 184 LBS | SYSTOLIC BLOOD PRESSURE: 136 MMHG | DIASTOLIC BLOOD PRESSURE: 74 MMHG | BODY MASS INDEX: 33.86 KG/M2 | OXYGEN SATURATION: 98 % | HEART RATE: 68 BPM | HEIGHT: 62 IN

## 2024-05-10 VITALS — HEIGHT: 62 IN | BODY MASS INDEX: 33.86 KG/M2 | WEIGHT: 184 LBS

## 2024-05-10 DIAGNOSIS — Z82.49 FAMILY HISTORY OF EARLY CAD: ICD-10-CM

## 2024-05-10 DIAGNOSIS — I42.8 NON-ISCHEMIC CARDIOMYOPATHY (HCC): ICD-10-CM

## 2024-05-10 DIAGNOSIS — I87.2 VENOUS INSUFFICIENCY (CHRONIC) (PERIPHERAL): ICD-10-CM

## 2024-05-10 DIAGNOSIS — I42.8 NON-ISCHEMIC CARDIOMYOPATHY (HCC): Primary | ICD-10-CM

## 2024-05-10 DIAGNOSIS — E78.2 MIXED HYPERLIPIDEMIA: ICD-10-CM

## 2024-05-10 DIAGNOSIS — I10 ESSENTIAL (PRIMARY) HYPERTENSION: ICD-10-CM

## 2024-05-10 DIAGNOSIS — I10 BENIGN ESSENTIAL HTN: ICD-10-CM

## 2024-05-10 LAB
ECHO AO ASC DIAM: 3.6 CM
ECHO AO ASCENDING AORTA INDEX: 1.96 CM/M2
ECHO AO ROOT DIAM: 3.6 CM
ECHO AO ROOT INDEX: 1.96 CM/M2
ECHO AV AREA PEAK VELOCITY: 1.7 CM2
ECHO AV AREA VTI: 1.7 CM2
ECHO AV AREA/BSA PEAK VELOCITY: 0.9 CM2/M2
ECHO AV AREA/BSA VTI: 0.9 CM2/M2
ECHO AV MEAN GRADIENT: 11 MMHG
ECHO AV MEAN VELOCITY: 1.6 M/S
ECHO AV PEAK GRADIENT: 19 MMHG
ECHO AV PEAK VELOCITY: 2.2 M/S
ECHO AV VELOCITY RATIO: 0.32
ECHO AV VTI: 48.5 CM
ECHO BSA: 1.91 M2
ECHO LA DIAMETER INDEX: 1.9 CM/M2
ECHO LA DIAMETER: 3.5 CM
ECHO LA TO AORTIC ROOT RATIO: 0.97
ECHO LA VOL A-L A2C: 104 ML (ref 22–52)
ECHO LA VOL A-L A4C: 76 ML (ref 22–52)
ECHO LA VOL BP: 85 ML (ref 22–52)
ECHO LA VOL MOD A2C: 100 ML (ref 22–52)
ECHO LA VOL MOD A4C: 71 ML (ref 22–52)
ECHO LA VOL/BSA BIPLANE: 46 ML/M2 (ref 16–34)
ECHO LA VOLUME AREA LENGTH: 89 ML
ECHO LA VOLUME INDEX A-L A2C: 57 ML/M2 (ref 16–34)
ECHO LA VOLUME INDEX A-L A4C: 41 ML/M2 (ref 16–34)
ECHO LA VOLUME INDEX AREA LENGTH: 48 ML/M2 (ref 16–34)
ECHO LA VOLUME INDEX MOD A2C: 54 ML/M2 (ref 16–34)
ECHO LA VOLUME INDEX MOD A4C: 39 ML/M2 (ref 16–34)
ECHO LV E' LATERAL VELOCITY: 5 CM/S
ECHO LV E' SEPTAL VELOCITY: 5 CM/S
ECHO LV EDV A2C: 88 ML
ECHO LV EDV A4C: 94 ML
ECHO LV EDV BP: 91 ML (ref 56–104)
ECHO LV EDV INDEX A4C: 51 ML/M2
ECHO LV EDV INDEX BP: 49 ML/M2
ECHO LV EDV NDEX A2C: 48 ML/M2
ECHO LV EJECTION FRACTION A2C: 61 %
ECHO LV EJECTION FRACTION A4C: 63 %
ECHO LV EJECTION FRACTION BIPLANE: 62 % (ref 55–100)
ECHO LV ESV A2C: 34 ML
ECHO LV ESV A4C: 34 ML
ECHO LV ESV BP: 34 ML (ref 19–49)
ECHO LV ESV INDEX A2C: 18 ML/M2
ECHO LV ESV INDEX A4C: 18 ML/M2
ECHO LV ESV INDEX BP: 18 ML/M2
ECHO LV FRACTIONAL SHORTENING: 21 % (ref 28–44)
ECHO LV INTERNAL DIMENSION DIASTOLE INDEX: 2.34 CM/M2
ECHO LV INTERNAL DIMENSION DIASTOLIC: 4.3 CM (ref 3.9–5.3)
ECHO LV INTERNAL DIMENSION SYSTOLIC INDEX: 1.85 CM/M2
ECHO LV INTERNAL DIMENSION SYSTOLIC: 3.4 CM
ECHO LV IVSD: 1.8 CM (ref 0.6–0.9)
ECHO LV MASS 2D: 360.4 G (ref 67–162)
ECHO LV MASS INDEX 2D: 195.9 G/M2 (ref 43–95)
ECHO LV POSTERIOR WALL DIASTOLIC: 1.9 CM (ref 0.6–0.9)
ECHO LV RELATIVE WALL THICKNESS RATIO: 0.88
ECHO LVOT AREA: 4.9 CM2
ECHO LVOT AV VTI INDEX: 0.35
ECHO LVOT DIAM: 2.5 CM
ECHO LVOT MEAN GRADIENT: 1 MMHG
ECHO LVOT PEAK GRADIENT: 2 MMHG
ECHO LVOT PEAK VELOCITY: 0.7 M/S
ECHO LVOT STROKE VOLUME INDEX: 45.1 ML/M2
ECHO LVOT SV: 82.9 ML
ECHO LVOT VTI: 16.9 CM
ECHO MV A VELOCITY: 0.94 M/S
ECHO MV E DECELERATION TIME (DT): 234.7 MS
ECHO MV E VELOCITY: 0.65 M/S
ECHO MV E/A RATIO: 0.69
ECHO MV E/E' LATERAL: 13
ECHO MV E/E' RATIO (AVERAGED): 13
ECHO MV E/E' SEPTAL: 13
ECHO PV MAX VELOCITY: 1.2 M/S
ECHO PV PEAK GRADIENT: 5 MMHG
ECHO RV TAPSE: 2.3 CM (ref 1.7–?)
ECHO TV REGURGITANT MAX VELOCITY: 2.56 M/S
ECHO TV REGURGITANT PEAK GRADIENT: 26 MMHG

## 2024-05-10 PROCEDURE — G8427 DOCREV CUR MEDS BY ELIG CLIN: HCPCS | Performed by: INTERNAL MEDICINE

## 2024-05-10 PROCEDURE — 93005 ELECTROCARDIOGRAM TRACING: CPT | Performed by: INTERNAL MEDICINE

## 2024-05-10 PROCEDURE — 93306 TTE W/DOPPLER COMPLETE: CPT | Performed by: INTERNAL MEDICINE

## 2024-05-10 PROCEDURE — 99214 OFFICE O/P EST MOD 30 MIN: CPT | Performed by: INTERNAL MEDICINE

## 2024-05-10 PROCEDURE — 1036F TOBACCO NON-USER: CPT | Performed by: INTERNAL MEDICINE

## 2024-05-10 PROCEDURE — 1123F ACP DISCUSS/DSCN MKR DOCD: CPT | Performed by: INTERNAL MEDICINE

## 2024-05-10 PROCEDURE — 93010 ELECTROCARDIOGRAM REPORT: CPT | Performed by: INTERNAL MEDICINE

## 2024-05-10 PROCEDURE — 1090F PRES/ABSN URINE INCON ASSESS: CPT | Performed by: INTERNAL MEDICINE

## 2024-05-10 PROCEDURE — G8417 CALC BMI ABV UP PARAM F/U: HCPCS | Performed by: INTERNAL MEDICINE

## 2024-05-10 RX ORDER — FUROSEMIDE 20 MG/1
20 TABLET ORAL 2 TIMES DAILY
COMMUNITY

## 2024-05-10 RX ORDER — KETOCONAZOLE 20 MG/G
CREAM TOPICAL DAILY
COMMUNITY

## 2024-05-10 ASSESSMENT — PATIENT HEALTH QUESTIONNAIRE - PHQ9
SUM OF ALL RESPONSES TO PHQ QUESTIONS 1-9: 0
2. FEELING DOWN, DEPRESSED OR HOPELESS: NOT AT ALL
1. LITTLE INTEREST OR PLEASURE IN DOING THINGS: NOT AT ALL
SUM OF ALL RESPONSES TO PHQ QUESTIONS 1-9: 0
SUM OF ALL RESPONSES TO PHQ9 QUESTIONS 1 & 2: 0

## 2024-05-10 NOTE — PROGRESS NOTES
Cholecalciferol (VITAMIN D-3 PO) Take 1 tablet by mouth daily She takes the gummies    carvedilol (COREG) 12.5 MG tablet TAKE 1 TABLET BY MOUTH TWICE A DAY WITH MEALS    lisinopril (PRINIVIL;ZESTRIL) 40 MG tablet Take 1 tablet by mouth daily    acetaminophen (TYLENOL) 650 MG extended release tablet Take 1 tablet by mouth daily    amoxicillin (AMOXIL) 500 MG capsule TAKE 4 CAPSULES BY MOUTH 1 HR PRIOR TO DENTAL PROCEDURE    Biotin 2.5 MG CAPS Take 1 capsule by mouth daily    coenzyme Q10 100 MG CAPS capsule Take 1 capsule by mouth daily    cyanocobalamin 1000 MCG tablet Take 1 tablet by mouth daily    fexofenadine (ALLEGRA) 180 MG tablet Take 1 tablet by mouth as needed    simvastatin (ZOCOR) 20 MG tablet Take 1 tablet by mouth nightly    furosemide (LASIX) 20 MG tablet Take 1 tablet by mouth 2 times daily (Patient not taking: Reported on 5/10/2024)     No current facility-administered medications for this visit.       Harish Lorenz MD  [facility]  [address] [place] [address], [address]  [place] [address]

## 2024-05-11 ENCOUNTER — PATIENT MESSAGE (OUTPATIENT)
Age: 89
End: 2024-05-11

## 2024-05-11 DIAGNOSIS — I42.8 NON-ISCHEMIC CARDIOMYOPATHY (HCC): Primary | ICD-10-CM

## 2024-05-13 RX ORDER — FUROSEMIDE 20 MG/1
20 TABLET ORAL DAILY
Qty: 90 TABLET | Refills: 1 | Status: SHIPPED | OUTPATIENT
Start: 2024-05-13

## 2024-05-13 NOTE — TELEPHONE ENCOUNTER
Attempted to reach patient by telephone. A message was left for return call.     Requested Prescriptions     Signed Prescriptions Disp Refills    furosemide (LASIX) 20 MG tablet 90 tablet 1     Sig: Take 1 tablet by mouth daily     Authorizing Provider: MEL LIND     Ordering User: ANNA BERNAL     Patient returned call. 2 PI verified. Went over med change and she will stop by the [facility] in our office building in 1 to 2 weeks to get her BMP checked. Verified understanding.

## 2024-05-13 NOTE — TELEPHONE ENCOUNTER
From: Lexie Joyce  To: Dr. Harish Lorenz  Sent: 5/11/2024 1:47 PM EDT  Subject: Hydrochlorothiazide    Forgot to mention when I was in th office on May 10, but Dr Rea suggested I discuss this drug with you. Apparently, it has been linked to skin cancer. I have had one removed and will have another removed on May 13. Should I continue taking? Thanks for your assistance with this subject.

## 2024-05-24 DIAGNOSIS — I42.8 NON-ISCHEMIC CARDIOMYOPATHY (HCC): ICD-10-CM

## 2024-05-24 LAB
ANION GAP SERPL CALC-SCNC: 5 MMOL/L (ref 5–15)
BUN SERPL-MCNC: 69 MG/DL (ref 6–20)
BUN/CREAT SERPL: 36 (ref 12–20)
CALCIUM SERPL-MCNC: 10.2 MG/DL (ref 8.5–10.1)
CHLORIDE SERPL-SCNC: 107 MMOL/L (ref 97–108)
CO2 SERPL-SCNC: 26 MMOL/L (ref 21–32)
CREAT SERPL-MCNC: 1.92 MG/DL (ref 0.55–1.02)
GLUCOSE SERPL-MCNC: 98 MG/DL (ref 65–100)
POTASSIUM SERPL-SCNC: 5.3 MMOL/L (ref 3.5–5.1)
SODIUM SERPL-SCNC: 138 MMOL/L (ref 136–145)

## 2024-05-28 ENCOUNTER — TELEPHONE (OUTPATIENT)
Age: 89
End: 2024-05-28

## 2024-05-28 RX ORDER — FUROSEMIDE 20 MG/1
20 TABLET ORAL DAILY PRN
Qty: 90 TABLET | Refills: 1 | Status: SHIPPED | OUTPATIENT
Start: 2024-05-28

## 2024-05-28 NOTE — TELEPHONE ENCOUNTER
Telephone call made to patient. Two patient identifiers verified.   Went over results with patient. Verified understanding. All questions answered.     Told patient to stop taking Furosemide daily only as needed sparingly for swelling.     The patient has a kidney doctor and will call and schedule an appointment for soon.

## 2024-05-28 NOTE — TELEPHONE ENCOUNTER
----- Message from Harish Lorenz MD sent at 5/24/2024 11:35 PM EDT -----  Please let pt know labs show worsening renal function. Would hold lasix (only take prn sparingly for LE edema). Needs to hydrate and see a kidney doctor (Dr. Whitley or [place]; first available) for Cr 1.9. thx

## 2024-05-30 ENCOUNTER — TELEPHONE (OUTPATIENT)
Age: 89
End: 2024-05-30

## 2024-05-30 DIAGNOSIS — R79.89 ELEVATED SERUM CREATININE: Primary | ICD-10-CM

## 2024-05-30 NOTE — TELEPHONE ENCOUNTER
[facility] call [facility], a nurse from Dr. Lnudberg's office. Confused about why she needed a sooner follow up. I let them know her labs came back with elevated cr. We wanted her to see a nephrologist. Sent the labs to Dr. Lundberg's office, but will call patient and refer to nephrology.     Telephone call made to patient. Two patient identifiers verified.   Educated the patient on the difference between urology and nephrology. Gave info and sent referral.   Dr. Thee Benjamin    [facility]   [address],  [facility] [place] [address] [phone]  Fax: [phone]     Faxed over last OV, Echo, EKG, and labs.

## 2024-08-27 DIAGNOSIS — I42.8 OTHER CARDIOMYOPATHIES (HCC): ICD-10-CM

## 2024-08-27 DIAGNOSIS — I20.0 UNSTABLE ANGINA (HCC): ICD-10-CM

## 2024-08-27 RX ORDER — CARVEDILOL 12.5 MG/1
12.5 TABLET ORAL 2 TIMES DAILY WITH MEALS
Qty: 180 TABLET | Refills: 3 | Status: SHIPPED | OUTPATIENT
Start: 2024-08-27

## 2024-08-27 NOTE — TELEPHONE ENCOUNTER
Requested Prescriptions     Signed Prescriptions Disp Refills    carvedilol (COREG) 12.5 MG tablet 180 tablet 3     Sig: TAKE 1 TABLET BY MOUTH TWICE A DAY WITH FOOD     Authorizing Provider: MEL LORENZ     Ordering User: ANNA BERNAL MD    Future Appointments   Date Time Provider Department Center   11/18/2024  3:20 PM Mel Lorenz MD [facility]

## 2024-10-31 DIAGNOSIS — I10 BENIGN ESSENTIAL HTN: ICD-10-CM

## 2024-10-31 RX ORDER — AMLODIPINE BESYLATE 5 MG/1
5 TABLET ORAL DAILY
Qty: 90 TABLET | Refills: 1 | Status: SHIPPED | OUTPATIENT
Start: 2024-10-31

## 2024-10-31 NOTE — TELEPHONE ENCOUNTER
Requested Prescriptions     Signed Prescriptions Disp Refills    amLODIPine (NORVASC) 5 MG tablet 90 tablet 1     Sig: TAKE 1 TABLET BY MOUTH EVERY DAY     Authorizing Provider: MEL LORENZ     Ordering User: ANNA BERNAL MD    Future Appointments   Date Time Provider Department Center   11/18/2024  3:20 PM Mel Lorenz MD [facility]

## 2024-11-18 ENCOUNTER — OFFICE VISIT (OUTPATIENT)
Age: 89
End: 2024-11-18
Payer: MEDICARE

## 2024-11-18 VITALS
BODY MASS INDEX: 34.52 KG/M2 | HEIGHT: 62 IN | WEIGHT: 187.6 LBS | SYSTOLIC BLOOD PRESSURE: 144 MMHG | DIASTOLIC BLOOD PRESSURE: 86 MMHG | HEART RATE: 70 BPM | OXYGEN SATURATION: 98 %

## 2024-11-18 DIAGNOSIS — I87.2 VENOUS INSUFFICIENCY (CHRONIC) (PERIPHERAL): ICD-10-CM

## 2024-11-18 DIAGNOSIS — E78.2 MIXED HYPERLIPIDEMIA: ICD-10-CM

## 2024-11-18 DIAGNOSIS — I42.8 NON-ISCHEMIC CARDIOMYOPATHY (HCC): Primary | ICD-10-CM

## 2024-11-18 DIAGNOSIS — Z82.49 FAMILY HISTORY OF EARLY CAD: ICD-10-CM

## 2024-11-18 DIAGNOSIS — I10 BENIGN ESSENTIAL HTN: ICD-10-CM

## 2024-11-18 PROCEDURE — 99214 OFFICE O/P EST MOD 30 MIN: CPT | Performed by: INTERNAL MEDICINE

## 2024-11-18 ASSESSMENT — PATIENT HEALTH QUESTIONNAIRE - PHQ9
1. LITTLE INTEREST OR PLEASURE IN DOING THINGS: NOT AT ALL
SUM OF ALL RESPONSES TO PHQ QUESTIONS 1-9: 1
2. FEELING DOWN, DEPRESSED OR HOPELESS: SEVERAL DAYS
SUM OF ALL RESPONSES TO PHQ QUESTIONS 1-9: 1
SUM OF ALL RESPONSES TO PHQ9 QUESTIONS 1 & 2: 1

## 2024-11-18 NOTE — PROGRESS NOTES
[facility] [place]: Lorenz  [phone]    History of Present Illness: Ms. Joyce is an 88 yo F non ischemic cardiomyopathy EF 60% by echo 5/2024 (as low as 20% in the past. Cath 1/15/21 with no CAD), history of tobacco use, essential hypertension, mixed hyperlipidemia, stage 4 bladder cancer s/p resection and therapy in remission.    Since last visit she did have concern for possible Hydrochlorothiazide being contraindicated for her skin and we changed this to Lasix.  However, when she took this regularly this caused worsening renal function and her creatinine went as high as 1.9.  I did have her see Dr. Benjamin and her Lasix was held and her creatinine stabilized around 1.6.  She now takes Lasix on an as needed basis, about a half pill once a week.  For her left leg, she pulled a calf muscle and went to see ortho. She did physical therapy for several weeks and they did exercises that both helped with her leg, as well as swelling.  She does wear compression for this as well.  She denies any exertional chest pain.  Breathing has been stable.  No significant palpitations.  She is compensated on exam with clear lungs. She does have lower extremity edema, left ankle greater than right.    Assessment/Plan:  1. Severe nonischemic cardiomyopathy, resolved.  Her most recent echo demonstrated EF of 60% (had been as low as 20% in the past).  Will have her follow up with a same day echo in six months.  Cardiac cath in 2021 demonstrated no significant CAD.  Continue betablocker and ACE inhibitor.  Spironolactone and Entresto were contraindicated due to CKD.  She is no longer on aspirin due to bleeding issues.  2. Stage IV bladder cancer, in remission.  3. Stage 3 CKD.  Followed by Dr. Benjamin.  4. Mixed hyperlipidemia.  Tolerating statin.  5. Essential hypertension.  Blood pressure controlled, no changes made.  6. Family history of early coronary artery disease.  7. History of tobacco use.  Quit many years ago.  8.

## 2024-11-18 NOTE — PROGRESS NOTES
1. Have you been to the ER, urgent care clinic since your last visit?  Hospitalized since your last visit?No    2. Have you seen or consulted any other health care providers outside of the [facility] since your last visit?  Include any pap smears or colon screening. Yes PCP-Dr. Harkins.   Kidney doctor-Dr. JESSICA   Uruology-Dr. Lundberg   Dermatology-Dr. Lujan

## 2025-02-03 RX ORDER — FUROSEMIDE 20 MG/1
20 TABLET ORAL DAILY PRN
Qty: 90 TABLET | Refills: 1 | Status: SHIPPED | OUTPATIENT
Start: 2025-02-03

## 2025-02-03 NOTE — TELEPHONE ENCOUNTER
Requested Prescriptions     Signed Prescriptions Disp Refills    furosemide (LASIX) 20 MG tablet 90 tablet 1     Sig: TAKE 1 TABLET BY MOUTH DAILY AS NEEDED (USE SPARINGLY FOR LOWER EXTREMITY SWELLING.)     Authorizing Provider: MEL LIND     Ordering User: ANNA BERNAL per MD    Future Appointments   Date Time Provider Department Center   5/20/2025  3:00 PM [facility] ECHO 1 SHANNAN NATARAJAN   5/20/2025  3:40 PM Mel Lind MD [facility]

## 2025-03-27 DIAGNOSIS — I10 BENIGN ESSENTIAL HTN: ICD-10-CM

## 2025-03-27 RX ORDER — AMLODIPINE BESYLATE 5 MG/1
5 TABLET ORAL DAILY
Qty: 90 TABLET | Refills: 2 | Status: SHIPPED | OUTPATIENT
Start: 2025-03-27

## 2025-03-27 NOTE — TELEPHONE ENCOUNTER
Requested Prescriptions     Signed Prescriptions Disp Refills    amLODIPine (NORVASC) 5 MG tablet 90 tablet 2     Sig: TAKE 1 TABLET BY MOUTH EVERY DAY     Authorizing Provider: MEL LIND     Ordering User: ANNA BERNAL MD    Future Appointments   Date Time Provider Department Center   5/20/2025  3:00 PM [facility] ECHO 1 SHANNAN NATARAJAN   5/20/2025  3:40 PM Mel Lind MD [facility]

## 2025-05-20 ENCOUNTER — OFFICE VISIT (OUTPATIENT)
Age: 89
End: 2025-05-20
Payer: MEDICARE

## 2025-05-20 ENCOUNTER — ANCILLARY PROCEDURE (OUTPATIENT)
Age: 89
End: 2025-05-20
Payer: MEDICARE

## 2025-05-20 VITALS
WEIGHT: 187 LBS | HEIGHT: 62 IN | BODY MASS INDEX: 34.41 KG/M2 | SYSTOLIC BLOOD PRESSURE: 144 MMHG | HEART RATE: 70 BPM | DIASTOLIC BLOOD PRESSURE: 86 MMHG

## 2025-05-20 VITALS
OXYGEN SATURATION: 92 % | HEIGHT: 62 IN | SYSTOLIC BLOOD PRESSURE: 128 MMHG | BODY MASS INDEX: 34.41 KG/M2 | DIASTOLIC BLOOD PRESSURE: 64 MMHG | HEART RATE: 71 BPM | WEIGHT: 187 LBS

## 2025-05-20 DIAGNOSIS — I87.2 VENOUS INSUFFICIENCY (CHRONIC) (PERIPHERAL): ICD-10-CM

## 2025-05-20 DIAGNOSIS — I42.8 NON-ISCHEMIC CARDIOMYOPATHY (HCC): ICD-10-CM

## 2025-05-20 DIAGNOSIS — I10 BENIGN ESSENTIAL HTN: ICD-10-CM

## 2025-05-20 DIAGNOSIS — E78.2 MIXED HYPERLIPIDEMIA: ICD-10-CM

## 2025-05-20 DIAGNOSIS — Z82.49 FAMILY HISTORY OF EARLY CAD: ICD-10-CM

## 2025-05-20 DIAGNOSIS — I42.8 NON-ISCHEMIC CARDIOMYOPATHY (HCC): Primary | ICD-10-CM

## 2025-05-20 LAB
ECHO AO ASC DIAM: 3.9 CM
ECHO AO ASCENDING AORTA INDEX: 2.1 CM/M2
ECHO AO ROOT DIAM: 3.5 CM
ECHO AO ROOT INDEX: 1.88 CM/M2
ECHO AV AREA PEAK VELOCITY: 1.5 CM2
ECHO AV AREA VTI: 1.6 CM2
ECHO AV AREA/BSA PEAK VELOCITY: 0.8 CM2/M2
ECHO AV AREA/BSA VTI: 0.9 CM2/M2
ECHO AV MEAN GRADIENT: 11 MMHG
ECHO AV MEAN VELOCITY: 1.6 M/S
ECHO AV PEAK GRADIENT: 20 MMHG
ECHO AV PEAK VELOCITY: 2.2 M/S
ECHO AV VELOCITY RATIO: 0.36
ECHO AV VTI: 54.8 CM
ECHO BSA: 1.93 M2
ECHO EST RA PRESSURE: 8 MMHG
ECHO LA DIAMETER INDEX: 2.26 CM/M2
ECHO LA DIAMETER: 4.2 CM
ECHO LA TO AORTIC ROOT RATIO: 1.2
ECHO LA VOL A-L A2C: 101 ML (ref 22–52)
ECHO LA VOL A-L A4C: 113 ML (ref 22–52)
ECHO LA VOL BP: 113 ML (ref 22–52)
ECHO LA VOL MOD A2C: 98 ML (ref 22–52)
ECHO LA VOL MOD A4C: 107 ML (ref 22–52)
ECHO LA VOL/BSA BIPLANE: 61 ML/M2 (ref 16–34)
ECHO LA VOLUME AREA LENGTH: 119 ML
ECHO LA VOLUME INDEX A-L A2C: 54 ML/M2 (ref 16–34)
ECHO LA VOLUME INDEX A-L A4C: 61 ML/M2 (ref 16–34)
ECHO LA VOLUME INDEX AREA LENGTH: 64 ML/M2 (ref 16–34)
ECHO LA VOLUME INDEX MOD A2C: 53 ML/M2 (ref 16–34)
ECHO LA VOLUME INDEX MOD A4C: 58 ML/M2 (ref 16–34)
ECHO LV E' LATERAL VELOCITY: 7.71 CM/S
ECHO LV E' SEPTAL VELOCITY: 5.48 CM/S
ECHO LV EDV A2C: 90 ML
ECHO LV EDV A4C: 96 ML
ECHO LV EDV BP: 95 ML (ref 56–104)
ECHO LV EDV INDEX A4C: 52 ML/M2
ECHO LV EDV INDEX BP: 51 ML/M2
ECHO LV EDV NDEX A2C: 48 ML/M2
ECHO LV EF PHYSICIAN: 60 %
ECHO LV EJECTION FRACTION A2C: 65 %
ECHO LV EJECTION FRACTION A4C: 58 %
ECHO LV EJECTION FRACTION BIPLANE: 62 % (ref 55–100)
ECHO LV ESV A2C: 31 ML
ECHO LV ESV A4C: 41 ML
ECHO LV ESV BP: 36 ML (ref 19–49)
ECHO LV ESV INDEX A2C: 17 ML/M2
ECHO LV ESV INDEX A4C: 22 ML/M2
ECHO LV ESV INDEX BP: 19 ML/M2
ECHO LV FRACTIONAL SHORTENING: 31 % (ref 28–44)
ECHO LV INTERNAL DIMENSION DIASTOLE INDEX: 2.8 CM/M2
ECHO LV INTERNAL DIMENSION DIASTOLIC: 5.2 CM (ref 3.9–5.3)
ECHO LV INTERNAL DIMENSION SYSTOLIC INDEX: 1.94 CM/M2
ECHO LV INTERNAL DIMENSION SYSTOLIC: 3.6 CM
ECHO LV IVSD: 1.4 CM (ref 0.6–0.9)
ECHO LV MASS 2D: 309.6 G (ref 67–162)
ECHO LV MASS INDEX 2D: 166.5 G/M2 (ref 43–95)
ECHO LV POSTERIOR WALL DIASTOLIC: 1.4 CM (ref 0.6–0.9)
ECHO LV RELATIVE WALL THICKNESS RATIO: 0.54
ECHO LVOT AREA: 4.2 CM2
ECHO LVOT AV VTI INDEX: 0.37
ECHO LVOT DIAM: 2.3 CM
ECHO LVOT MEAN GRADIENT: 1 MMHG
ECHO LVOT PEAK GRADIENT: 3 MMHG
ECHO LVOT PEAK VELOCITY: 0.8 M/S
ECHO LVOT STROKE VOLUME INDEX: 45.1 ML/M2
ECHO LVOT SV: 83.9 ML
ECHO LVOT VTI: 20.2 CM
ECHO MV A VELOCITY: 0.9 M/S
ECHO MV E DECELERATION TIME (DT): 245.5 MS
ECHO MV E VELOCITY: 0.76 M/S
ECHO MV E/A RATIO: 0.84
ECHO MV E/E' LATERAL: 9.86
ECHO MV E/E' RATIO (AVERAGED): 11.86
ECHO MV E/E' SEPTAL: 13.87
ECHO PULMONARY ARTERY END DIASTOLIC PRESSURE: 5 MMHG
ECHO PV MAX VELOCITY: 1.2 M/S
ECHO PV PEAK GRADIENT: 5 MMHG
ECHO PV REGURGITANT MAX VELOCITY: 1.1 M/S
ECHO RIGHT VENTRICULAR SYSTOLIC PRESSURE (RVSP): 48 MMHG
ECHO RV BASAL DIMENSION: 4.1 CM
ECHO RV TAPSE: 1.9 CM (ref 1.7–?)
ECHO RVOT PEAK GRADIENT: 2 MMHG
ECHO RVOT PEAK VELOCITY: 0.7 M/S
ECHO TV REGURGITANT MAX VELOCITY: 3.18 M/S
ECHO TV REGURGITANT PEAK GRADIENT: 40 MMHG

## 2025-05-20 PROCEDURE — 99214 OFFICE O/P EST MOD 30 MIN: CPT | Performed by: INTERNAL MEDICINE

## 2025-05-20 PROCEDURE — 93306 TTE W/DOPPLER COMPLETE: CPT | Performed by: INTERNAL MEDICINE

## 2025-05-20 PROCEDURE — 93005 ELECTROCARDIOGRAM TRACING: CPT | Performed by: INTERNAL MEDICINE

## 2025-05-20 RX ORDER — DAPAGLIFLOZIN 5 MG/1
5 TABLET, FILM COATED ORAL EVERY MORNING
COMMUNITY

## 2025-05-20 ASSESSMENT — PATIENT HEALTH QUESTIONNAIRE - PHQ9
SUM OF ALL RESPONSES TO PHQ QUESTIONS 1-9: 0
2. FEELING DOWN, DEPRESSED OR HOPELESS: NOT AT ALL
SUM OF ALL RESPONSES TO PHQ QUESTIONS 1-9: 0
1. LITTLE INTEREST OR PLEASURE IN DOING THINGS: NOT AT ALL
SUM OF ALL RESPONSES TO PHQ QUESTIONS 1-9: 0
SUM OF ALL RESPONSES TO PHQ QUESTIONS 1-9: 0

## 2025-05-20 NOTE — PROGRESS NOTES
[facility] [place]: Lorenz  [phone]    History of Present Illness: Ms. Joyce is an 88 yo F non ischemic cardiomyopathy EF 60% by echo 5/2024 (as low as 20% in the past. Cath 1/15/21 with no CAD), history of tobacco use, essential hypertension, mixed hyperlipidemia, stage 4 bladder cancer s/p resection and therapy in remission.    Since her last visit, she continues to do well cardiac-wise.  No exertional chest pain.  Breathing has been stable.  No significant palpitations.  She does have some lower extremity edema though she notes this really had not been different.  She does wear compression that helps.  She did see Nephrology, Dr. Benjamin recently and made some slight adjustments with her medication there. She is compensated on exam with clear lungs.  She does have lower extremity edema that is chronic, left greater than right. Her echo today was unchanged with preserved LV function, EF of 60 to 65% and we discussed the results.    Assessment and Plan:  1. Severe nonischemic cardiomyopathy.  Resolved and her echo today demonstrates EF of 60% (as low as 20% in the past).  Cardiac cath in 2021 demonstrated no significant CAD.  Continue beta-blocker, ACE inhibitor.  Spironolactone and Entresto are contraindicated due to her CKD.  No longer on aspirin due to bleeding issues.  2. Stage 4 bladder cancer.  In remission.  3. CKD stage 3.  Followed by Dr. Benjamin.  4. Mixed hyperlipidemia.  Tolerating statin.  5. Essential hypertension.  Blood pressure is controlled and no changes made.  6. Family history of early CAD.  7. Venous insufficiency.  Elevate legs at rest, minimize salt intake, compression, p.r.n.  She does take Lasix p.r.n. as well.  As noted above, her echo demonstrated normal systolic function, unchanged.  Consider repeat echo in 1 year.      She  has a past medical history of Anemia, Arthritis, Bradycardia, Cancer (HCC), Hyperlipidemia, Hypertension, and Skin cancer.    All other systems

## 2025-06-22 ENCOUNTER — PATIENT MESSAGE (OUTPATIENT)
Age: 89
End: 2025-06-22

## 2025-06-22 DIAGNOSIS — I20.0 UNSTABLE ANGINA (HCC): ICD-10-CM

## 2025-06-22 DIAGNOSIS — I42.8 OTHER CARDIOMYOPATHIES (HCC): ICD-10-CM

## 2025-06-22 RX ORDER — CARVEDILOL 12.5 MG/1
12.5 TABLET ORAL 2 TIMES DAILY WITH MEALS
Qty: 180 TABLET | Refills: 3 | Status: CANCELLED | OUTPATIENT
Start: 2025-06-22

## 2025-06-23 RX ORDER — CARVEDILOL 25 MG/1
25 TABLET ORAL 2 TIMES DAILY WITH MEALS
Qty: 180 TABLET | Refills: 2 | Status: SHIPPED | OUTPATIENT
Start: 2025-06-23

## (undated) DEVICE — ENDOSCOPIC KIT COMPLIANCE ENDOKIT

## (undated) DEVICE — GLIDESHEATH SLENDER STAINLESS STEEL KIT: Brand: GLIDESHEATH SLENDER

## (undated) DEVICE — KIT MFLD ISOLATN NACL CNTRST PRT TBNG SPIK W/ PRSS TRNSDUC

## (undated) DEVICE — CONTAINER SPEC 20 ML LID NEUT BUFF FORMALIN 10 % POLYPR STS

## (undated) DEVICE — CUFF BLD PRSS AD CLTH SGL TB W/ BAYNT CONN ROUNDED CORNER

## (undated) DEVICE — TR BAND RADIAL ARTERY COMPRESSION DEVICE: Brand: TR BAND

## (undated) DEVICE — ANGIOGRAPHIC CATHETER: Brand: IMPULSE™

## (undated) DEVICE — CATHETER IV 20GA L1.16IN OD1.0414-1.1176MM ID0.762-0.8382MM

## (undated) DEVICE — KIT HND CTRL 3 W STPCOCK ROT END 54IN PREM HI PRSS TBNG AT

## (undated) DEVICE — PACK PROCEDURE SURG HRT CATH

## (undated) DEVICE — KIT MED IMAG CNTRST AGNT W/ IOPAMIDOL REUSE

## (undated) DEVICE — 3M™ TEGADERM™ TRANSPARENT FILM DRESSING FRAME STYLE, 1626W, 4 IN X 4-3/4 IN (10 CM X 12 CM), 50/CT 4CT/CASE: Brand: 3M™ TEGADERM™

## (undated) DEVICE — SET GRAV CK VLV NEEDLESS ST 3 GANGED 4WAY STPCOCK HI FLO 10

## (undated) DEVICE — SYSTEM REPROC CBL 3 LD DISPOSABLE

## (undated) DEVICE — WASTE KIT - ST MARY: Brand: MEDLINE INDUSTRIES, INC.

## (undated) DEVICE — IV START KIT: Brand: MEDLINE

## (undated) DEVICE — SPECIAL PROCEDURE DRAPE 32" X 34": Brand: SPECIAL PROCEDURE DRAPE

## (undated) DEVICE — SPLINT WR POS F/ARTERIAL ACC -- BX/10

## (undated) DEVICE — FORCEPS BX L240CM JAW DIA2.4MM ORNG L CAP W/ NDL DISP RAD

## (undated) DEVICE — BITEBLOCK 54FR W/ DENT RIM BLOX